# Patient Record
Sex: FEMALE | Race: BLACK OR AFRICAN AMERICAN | NOT HISPANIC OR LATINO | Employment: PART TIME | URBAN - NONMETROPOLITAN AREA
[De-identification: names, ages, dates, MRNs, and addresses within clinical notes are randomized per-mention and may not be internally consistent; named-entity substitution may affect disease eponyms.]

---
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
## 2018-01-02 ENCOUNTER — OFFICE VISIT (OUTPATIENT)
Dept: FAMILY MEDICINE | Facility: OTHER | Age: 18
End: 2018-01-02
Attending: FAMILY MEDICINE
Payer: COMMERCIAL

## 2018-01-02 VITALS
HEIGHT: 71 IN | TEMPERATURE: 98 F | DIASTOLIC BLOOD PRESSURE: 62 MMHG | WEIGHT: 224 LBS | SYSTOLIC BLOOD PRESSURE: 112 MMHG | BODY MASS INDEX: 31.36 KG/M2

## 2018-01-02 DIAGNOSIS — F41.9 ANXIETY: Primary | ICD-10-CM

## 2018-01-02 DIAGNOSIS — E83.51 LOW CALCIUM LEVELS: ICD-10-CM

## 2018-01-02 DIAGNOSIS — F19.10 POLYSUBSTANCE ABUSE (H): ICD-10-CM

## 2018-01-02 DIAGNOSIS — R53.83 FATIGUE, UNSPECIFIED TYPE: ICD-10-CM

## 2018-01-02 DIAGNOSIS — E55.9 VITAMIN D DEFICIENCY: ICD-10-CM

## 2018-01-02 LAB
ALBUMIN SERPL-MCNC: 3.1 G/DL (ref 3.4–5)
ALP SERPL-CCNC: 107 U/L (ref 40–150)
ALT SERPL W P-5'-P-CCNC: 13 U/L (ref 0–50)
ANION GAP SERPL CALCULATED.3IONS-SCNC: 9 MMOL/L (ref 3–14)
AST SERPL W P-5'-P-CCNC: 15 U/L (ref 0–35)
BILIRUB DIRECT SERPL-MCNC: <0.1 MG/DL (ref 0–0.2)
BILIRUB SERPL-MCNC: 0.1 MG/DL (ref 0.2–1.3)
BUN SERPL-MCNC: 9 MG/DL (ref 7–30)
CALCIUM SERPL-MCNC: 8.5 MG/DL (ref 8.5–10.1)
CHLORIDE SERPL-SCNC: 107 MMOL/L (ref 96–110)
CO2 SERPL-SCNC: 24 MMOL/L (ref 20–32)
CREAT SERPL-MCNC: 0.53 MG/DL (ref 0.5–1)
ERYTHROCYTE [DISTWIDTH] IN BLOOD BY AUTOMATED COUNT: 13.2 % (ref 10–15)
GFR SERPL CREATININE-BSD FRML MDRD: >90 ML/MIN/1.7M2
GLUCOSE SERPL-MCNC: 96 MG/DL (ref 70–99)
HCT VFR BLD AUTO: 40.3 % (ref 35–47)
HGB BLD-MCNC: 13 G/DL (ref 11.7–15.7)
MCH RBC QN AUTO: 26.6 PG (ref 26.5–33)
MCHC RBC AUTO-ENTMCNC: 32.3 G/DL (ref 31.5–36.5)
MCV RBC AUTO: 82 FL (ref 78–100)
PLATELET # BLD AUTO: 288 10E9/L (ref 150–450)
POTASSIUM SERPL-SCNC: 3.5 MMOL/L (ref 3.4–5.3)
PROT SERPL-MCNC: 7.3 G/DL (ref 6.8–8.8)
RBC # BLD AUTO: 4.89 10E12/L (ref 3.8–5.2)
SODIUM SERPL-SCNC: 140 MMOL/L (ref 133–144)
TSH SERPL DL<=0.005 MIU/L-ACNC: 3.06 MU/L (ref 0.4–4)
WBC # BLD AUTO: 7.4 10E9/L (ref 4–11)

## 2018-01-02 PROCEDURE — 84443 ASSAY THYROID STIM HORMONE: CPT | Mod: ZL | Performed by: FAMILY MEDICINE

## 2018-01-02 PROCEDURE — 99213 OFFICE O/P EST LOW 20 MIN: CPT | Performed by: FAMILY MEDICINE

## 2018-01-02 PROCEDURE — 82306 VITAMIN D 25 HYDROXY: CPT | Mod: ZL | Performed by: FAMILY MEDICINE

## 2018-01-02 PROCEDURE — 80048 BASIC METABOLIC PNL TOTAL CA: CPT | Mod: ZL | Performed by: FAMILY MEDICINE

## 2018-01-02 PROCEDURE — 36415 COLL VENOUS BLD VENIPUNCTURE: CPT | Mod: ZL | Performed by: FAMILY MEDICINE

## 2018-01-02 PROCEDURE — G0463 HOSPITAL OUTPT CLINIC VISIT: HCPCS

## 2018-01-02 PROCEDURE — 85027 COMPLETE CBC AUTOMATED: CPT | Mod: ZL | Performed by: FAMILY MEDICINE

## 2018-01-02 PROCEDURE — 99000 SPECIMEN HANDLING OFFICE-LAB: CPT | Performed by: FAMILY MEDICINE

## 2018-01-02 PROCEDURE — 80076 HEPATIC FUNCTION PANEL: CPT | Mod: ZL | Performed by: FAMILY MEDICINE

## 2018-01-02 PROCEDURE — 82330 ASSAY OF CALCIUM: CPT | Mod: ZL | Performed by: FAMILY MEDICINE

## 2018-01-02 RX ORDER — FLUOXETINE 10 MG/1
10 CAPSULE ORAL DAILY
Qty: 7 CAPSULE | Refills: 0 | Status: SHIPPED | OUTPATIENT
Start: 2018-01-02 | End: 2018-01-09

## 2018-01-02 ASSESSMENT — ANXIETY QUESTIONNAIRES
GAD7 TOTAL SCORE: 3
2. NOT BEING ABLE TO STOP OR CONTROL WORRYING: NOT AT ALL
6. BECOMING EASILY ANNOYED OR IRRITABLE: NOT AT ALL
3. WORRYING TOO MUCH ABOUT DIFFERENT THINGS: NOT AT ALL
5. BEING SO RESTLESS THAT IT IS HARD TO SIT STILL: NOT AT ALL
IF YOU CHECKED OFF ANY PROBLEMS ON THIS QUESTIONNAIRE, HOW DIFFICULT HAVE THESE PROBLEMS MADE IT FOR YOU TO DO YOUR WORK, TAKE CARE OF THINGS AT HOME, OR GET ALONG WITH OTHER PEOPLE: SOMEWHAT DIFFICULT
7. FEELING AFRAID AS IF SOMETHING AWFUL MIGHT HAPPEN: NOT AT ALL
4. TROUBLE RELAXING: NOT AT ALL
1. FEELING NERVOUS, ANXIOUS, OR ON EDGE: NEARLY EVERY DAY

## 2018-01-02 ASSESSMENT — PATIENT HEALTH QUESTIONNAIRE - PHQ9: SUM OF ALL RESPONSES TO PHQ QUESTIONS 1-9: 2

## 2018-01-02 ASSESSMENT — PAIN SCALES - GENERAL: PAINLEVEL: NO PAIN (0)

## 2018-01-02 NOTE — NURSING NOTE
"Chief Complaint   Patient presents with     Establish Care     Depression       Initial /62 (BP Location: Right arm, Patient Position: Chair, Cuff Size: Adult Large)  Temp 98  F (36.7  C) (Tympanic)  Ht 5' 11\" (1.803 m)  Wt 224 lb (101.6 kg)  BMI 31.24 kg/m2 Estimated body mass index is 31.24 kg/(m^2) as calculated from the following:    Height as of this encounter: 5' 11\" (1.803 m).    Weight as of this encounter: 224 lb (101.6 kg).  Medication Reconciliation: complete     Mabel Jon     "

## 2018-01-02 NOTE — PATIENT INSTRUCTIONS
Treating Anxiety Disorders with Medicine  An anxiety disorder can make you feel nervous or apprehensive, even without a clear reason. In people age 65 and older, generalized anxiety disorder is one of the most commonly diagnosed anxiety disorders. Many times it occurs with depression. Certain anxiety disorders can cause intense feelings of fear or panic. You may even have physical symptoms such as a racing heartbeat, sweating, or dizziness. If you have these feelings, you don t have to suffer anymore. Treatment to help you overcome your fears will likely include therapy (also called counseling). Medicine may also be prescribed to help control your symptoms.    Medicines  Certain medicines may be prescribed to help control your symptoms. So you may feel less anxious. You may also feel able to move forward with therapy. At first, medicines and dosages may need to be adjusted to find what works best for you. Try to be patient. Tell your healthcare provider how a medicine makes you feel. This way, you can work together to find the treatment that s best for you. Keep in mind that medicines can have side effects. Talk with your provider about any side effects that are bothering you. Changing the dose or type of medicine may help. Don t stop taking medicine on your own. That can cause symptoms to come back.    Anti-anxiety medicine. This medicine eases symptoms and helps you relax. Your healthcare provider will explain when and how to use it. It may be prescribed for use before situations that make you anxious. You may also be told to take medicine on a regular schedule. Anti-anxiety medicine may make you feel a little sleepy or  out of it.  Don t drive a car or operate machinery while on this medicine, until you know how it affects you.  Caution  Never use alcohol or other drugs with anti-anxiety medicines. This could result in loss of muscular control, sedation, coma, or death. Also, use only the amount of medicine  prescribed for you. If you think you may have taken too much, get emergency care right away.     Antidepressant medicine. This kind of medicine is often used to treat anxiety, even if you aren t depressed. An antidepressant helps balance out brain chemicals. This helps keep anxiety under control. This medicine is taken on a schedule. It takes a few weeks to start working. If you don t notice a change at first, you may just need more time. But if you don t notice results after the first few weeks, tell your provider.  Keep taking medicines as prescribed  Never change your dosage, share or use another person's medicine, or stop taking your medicines without talking to your healthcare provider first. Keep the following in mind:    Some medicines must be taken on a schedule. Make this part of your daily routine. For instance, always take your pill before brushing your teeth. A pillbox can help you remember if you ve taken your medicine each day.    Medicines are often taken for 6 to 12 months. Your healthcare provider will then evaluate whether you need to stay on them. Many people who have also had therapy may no longer need medicine to manage anxiety.    You may need to stop taking medicine slowly to give your body time to adjust. When it s time to stop, your healthcare provider will tell you more. Remember: Never stop taking your medicine without talking to your provider first.    If symptoms return, you may need to start taking medicines again. This isn t your fault. It s just the nature of your anxiety disorder.  Special concerns    Side effects. Medicines may cause side effects. Ask your healthcare provider or pharmacist what you can expect. They may have ideas for avoiding some side effects.    Sexual problems. Some antidepressants can affect your desire for sex or your ability to have an orgasm. A change in dosage or medicine often solves the problem. If you have a sexual side effect that concerns you, tell your  healthcare provider.    Addiction. If you ve never had a problem with drugs or alcohol, you may not have a problem with medicines used to treat anxiety disorders. But always discuss the medicines with your healthcare provider before taking them. If you have a history of addiction, you may not be able to use certain medicines used to treat anxiety disorders.    Medicine interactions. Always check with your pharmacist before using any over-the-counter medicines, including herbal supplements.   Date Last Reviewed: 5/1/2017 2000-2017 [employer]. [address] [place] [address]. All rights reserved. This information is not intended as a substitute for professional medical care. Always follow your healthcare professional's instructions.

## 2018-01-02 NOTE — MR AVS SNAPSHOT
After Visit Summary   1/2/2018    Zara Aguilera    MRN: [id number]           Patient Information     Date Of Birth          2000        Visit Information        Provider Department      1/2/2018 4:30 PM Kate Moyer MD [facility] [place]        Today's Diagnoses     Anxiety    -  1    Vitamin D deficiency        Low calcium levels        Fatigue, unspecified type        Polysubstance abuse          Care Instructions      Treating Anxiety Disorders with Medicine  An anxiety disorder can make you feel nervous or apprehensive, even without a clear reason. In people age 65 and older, generalized anxiety disorder is one of the most commonly diagnosed anxiety disorders. Many times it occurs with depression. Certain anxiety disorders can cause intense feelings of fear or panic. You may even have physical symptoms such as a racing heartbeat, sweating, or dizziness. If you have these feelings, you don t have to suffer anymore. Treatment to help you overcome your fears will likely include therapy (also called counseling). Medicine may also be prescribed to help control your symptoms.    Medicines  Certain medicines may be prescribed to help control your symptoms. So you may feel less anxious. You may also feel able to move forward with therapy. At first, medicines and dosages may need to be adjusted to find what works best for you. Try to be patient. Tell your healthcare provider how a medicine makes you feel. This way, you can work together to find the treatment that s best for you. Keep in mind that medicines can have side effects. Talk with your provider about any side effects that are bothering you. Changing the dose or type of medicine may help. Don t stop taking medicine on your own. That can cause symptoms to come back.    Anti-anxiety medicine. This medicine eases symptoms and helps you relax. Your healthcare provider will explain when and how to use it. It may be prescribed for use  before situations that make you anxious. You may also be told to take medicine on a regular schedule. Anti-anxiety medicine may make you feel a little sleepy or  out of it.  Don t drive a car or operate machinery while on this medicine, until you know how it affects you.  Caution  Never use alcohol or other drugs with anti-anxiety medicines. This could result in loss of muscular control, sedation, coma, or death. Also, use only the amount of medicine prescribed for you. If you think you may have taken too much, get emergency care right away.     Antidepressant medicine. This kind of medicine is often used to treat anxiety, even if you aren t depressed. An antidepressant helps balance out brain chemicals. This helps keep anxiety under control. This medicine is taken on a schedule. It takes a few weeks to start working. If you don t notice a change at first, you may just need more time. But if you don t notice results after the first few weeks, tell your provider.  Keep taking medicines as prescribed  Never change your dosage, share or use another person's medicine, or stop taking your medicines without talking to your healthcare provider first. Keep the following in mind:    Some medicines must be taken on a schedule. Make this part of your daily routine. For instance, always take your pill before brushing your teeth. A pillbox can help you remember if you ve taken your medicine each day.    Medicines are often taken for 6 to 12 months. Your healthcare provider will then evaluate whether you need to stay on them. Many people who have also had therapy may no longer need medicine to manage anxiety.    You may need to stop taking medicine slowly to give your body time to adjust. When it s time to stop, your healthcare provider will tell you more. Remember: Never stop taking your medicine without talking to your provider first.    If symptoms return, you may need to start taking medicines again. This isn t your fault.  It s just the nature of your anxiety disorder.  Special concerns    Side effects. Medicines may cause side effects. Ask your healthcare provider or pharmacist what you can expect. They may have ideas for avoiding some side effects.    Sexual problems. Some antidepressants can affect your desire for sex or your ability to have an orgasm. A change in dosage or medicine often solves the problem. If you have a sexual side effect that concerns you, tell your healthcare provider.    Addiction. If you ve never had a problem with drugs or alcohol, you may not have a problem with medicines used to treat anxiety disorders. But always discuss the medicines with your healthcare provider before taking them. If you have a history of addiction, you may not be able to use certain medicines used to treat anxiety disorders.    Medicine interactions. Always check with your pharmacist before using any over-the-counter medicines, including herbal supplements.   Date Last Reviewed: 5/1/2017 2000-2017 [employer]. [address] [place] [address]. All rights reserved. This information is not intended as a substitute for professional medical care. Always follow your healthcare professional's instructions.                Follow-ups after your visit        Follow-up notes from your care team     Return in about 1 week (around 1/9/2018).      Who to contact     If you have questions or need follow up information about today's clinic visit or your schedule please contact [facility] directly at [phone].  Normal or non-critical lab and imaging results will be communicated to you by MyChart, letter or phone within 4 business days after the clinic has received the results. If you do not hear from us within 7 days, please contact the clinic through MyChart or phone. If you have a critical or abnormal lab result, we will notify you by phone as soon as possible.  Submit refill requests through [employer] or  "call your pharmacy and they will forward the refill request to us. Please allow 3 business days for your refill to be completed.          Additional Information About Your Visit        MyChart Information     [employer] lets you send messages to your doctor, view your test results, renew your prescriptions, schedule appointments and more. To sign up, go to [email or web address] . Click on \"Log in\" on the left side of the screen, which will take you to the Welcome page. Then click on \"Sign up Now\" on the right side of the page.     You will be asked to enter the access code listed below, as well as some personal information. Please follow the directions to create your username and password.     Your access code is: [email or web address]  Expires: 3/13/2018 11:03 AM     Your access code will  in 90 days. If you need help or a new code, please call your [place] [facility] clinic or [phone].        Care EveryWhere ID     This is your Care EveryWhere ID. This could be used by other organizations to access your [facility] medical records  [id number]        Your Vitals Were     Temperature Height BMI (Body Mass Index)             98  F (36.7  C) (Tympanic) 5' 11\" (1.803 m) 31.24 kg/m2          Blood Pressure from Last 3 Encounters:   18 112/62   17 119/79   17 113/53    Weight from Last 3 Encounters:   18 224 lb (101.6 kg) (99 %)*   17 212 lb (96.2 kg) (98 %)*   17 212 lb (96.2 kg) (98 %)*     * Growth percentiles are based on CDC 2-20 Years data.              We Performed the Following     Basic metabolic panel     Calcium ionized     CBC with platelets     Hepatic panel (Albumin, ALT, AST, Bili, Alk Phos, TP)     TSH with free T4 reflex     Vitamin D Deficiency          Today's Medication Changes          These changes are accurate as of: 18  4:54 PM.  If you have any questions, ask your nurse or doctor.               These medicines have changed or have updated prescriptions.        " Dose/Directions    * FLUoxetine 10 MG capsule   Commonly known as:  PROzac   This may have changed:  You were already taking a medication with the same name, and this prescription was added. Make sure you understand how and when to take each.   Used for:  Anxiety   Changed by:  Kate Moyer MD        Dose:  10 mg   Take 1 capsule (10 mg) by mouth daily for 7 days Take with the 20mg capsule for 7 days for a total dose of 30mg. Then increase to 40mg (2, 20mg pills).   Quantity:  7 capsule   Refills:  0       * FLUoxetine 20 MG capsule   Commonly known as:  PROZAC   This may have changed:    - medication strength  - how much to take   Used for:  Anxiety   Changed by:  Kate Moyer MD        Dose:  40 mg   Take 2 capsules (40 mg) by mouth daily   Quantity:  60 capsule   Refills:  0       * Notice:  This list has 2 medication(s) that are the same as other medications prescribed for you. Read the directions carefully, and ask your doctor or other care provider to review them with you.         Where to get your medicines      These medications were sent to [facility] - [address]  [address] [address]     Phone:  [phone]     FLUoxetine 20 MG capsule         Some of these will need a paper prescription and others can be bought over the counter.  Ask your nurse if you have questions.     Bring a paper prescription for each of these medications     FLUoxetine 10 MG capsule                Primary Care Provider Office Phone # Fax #    Sadia OlmedoSRINI [facility] [phone]       [address] [address]        Equal Access to Services     SWAPNA TAYLOR AH: Hadii svetlana estradao [place], waaxda luqadaha, qaybta kaalmada adeegyada, ramana rodriguez. So [facility] [phone].    ATENCIÓN: Si habla español, tiene a silva disposición servicios gratuitos de asistencia lingüística. Llame al [phone].    We comply with  applicable federal civil rights laws and [place] laws. We do not discriminate on the basis of race, color, national origin, age, disability, sex, sexual orientation, or gender identity.            Thank you!     Thank you for choosing [facility]  for your care. Our goal is always to provide you with excellent care. Hearing back from our patients is one way we can continue to improve our services. Please take a few minutes to complete the written survey that you may receive in the mail after your visit with us. Thank you!             Your Updated Medication List - Protect others around you: Learn how to safely use, store and throw away your medicines at www.disposemymeds.org.          This list is accurate as of: 1/2/18  4:54 PM.  Always use your most recent med list.                   Brand Name Dispense Instructions for use Diagnosis    * FLUoxetine 10 MG capsule    PROzac    7 capsule    Take 1 capsule (10 mg) by mouth daily for 7 days Take with the 20mg capsule for 7 days for a total dose of 30mg. Then increase to 40mg (2, 20mg pills).    Anxiety       * FLUoxetine 20 MG capsule    PROZAC    60 capsule    Take 2 capsules (40 mg) by mouth daily    Anxiety       norgestimate-ethinyl estradiol 0.25-35 MG-MCG per tablet    ORTHO-CYCLEN, SPRINTEC    84 tablet    Take 1 tablet by mouth daily    Encounter for surveillance of contraceptive pills       UNKNOWN TO PATIENT           * Notice:  This list has 2 medication(s) that are the same as other medications prescribed for you. Read the directions carefully, and ask your doctor or other care provider to review them with you.

## 2018-01-02 NOTE — PROGRESS NOTES
SUBJECTIVE:   Zara Aguilera is a 17 year old female who presents to clinic today for the following health issues:      Depression Followup    Status since last visit: Improved with prozac    See PHQ-9 for current symptoms.  Other associated symptoms: None    Complicating factors:   Significant life event:  Yes-  Went to treatment   Current substance abuse:  None  Anxiety or Panic symptoms:  Yes-  Anxiety symptoms    In the past two weeks have you had thoughts of suicide or self-harm?  No.    Do you have concerns about your personal safety or the safety of others?   No      Amount of exercise or physical activity: None    Problems taking medications regularly: No    Medication side effects: none    Diet: regular (no restrictions)      Pt is a 18 yo F here with her mother who presents to establish care and for refill of medication. Pt has history of heroin, thc and methamphetamine use. She also reports long standing history of anxiety. Reports anxiety first diagnosed at age 11, this was around the same time she started using THC. Her drug use ramped over the next several years and she was trialed on Cymbalta, Abilify and Lamictal for mood. Mom feels the Abilify made her a zombie, but otherwise was hard to say if treatments helped due to her ongoing drug abuse. She has had 3 suicide attempts, two with overdosing on pills and 1 when she attempted to slit her wrists. She finally presented to the ED on 9/11 for clearance to go to detox. She has been off drugs since that time. She was transferred to the [facility] after detox for probation violation related to theft charges in the past. While at [facility] she was seen by a psychiatrist and prescribed prozac 20mg. She feels this has helped a fair amount. Questions whether or not she needs to increase, however. Pt also notes significant fatigue. Mom states she naps several times a day. Mom would like thyroid testing as she also has thyroid issues. Pt is back at  and is completing  "11th grade. She has a lot of anxiety going to school on a daily basis. Almost vomited on the first day due to nerves. Is doing fairly well, however, per mom. Has no other etoh or substance use (save for tobacco) since 9/11. No SI, SIB currently.     Problem list and histories reviewed & adjusted, as indicated.  Additional history: as documented    Labs reviewed in EPIC    Reviewed and updated as needed this visit by clinical staffTobacco  Allergies  Meds  Med Hx  Surg Hx  Fam Hx  Soc Hx      Reviewed and updated as needed this visit by Provider         ROS:  Constitutional, HEENT, cardiovascular, pulmonary, gi and gu systems are negative, except as otherwise noted.      OBJECTIVE:   /62 (BP Location: Right arm, Patient Position: Chair, Cuff Size: Adult Large)  Temp 98  F (36.7  C) (Tympanic)  Ht 5' 11\" (1.803 m)  Wt 224 lb (101.6 kg)  BMI 31.24 kg/m2  Body mass index is 31.24 kg/(m^2).  GENERAL: healthy, alert and no distress  EYES: Eyes grossly normal to inspection, PERRL and conjunctivae and sclerae normal  HENT: ear canals and TM's normal, nose and mouth without ulcers or lesions  NECK: no adenopathy, no asymmetry, masses, or scars and thyroid normal to palpation  RESP: lungs clear to auscultation - no rales, rhonchi or wheezes  CV: regular rate and rhythm, normal S1 S2, no S3 or S4, no murmur, click or rub, no peripheral edema and peripheral pulses strong  ABDOMEN: soft, nontender, no hepatosplenomegaly, no masses and bowel sounds normal  MS: no gross musculoskeletal defects noted, no edema  PSYCH: mentation appears normal, affect slightly blunted, appears fatigued    Diagnostic Test Results:  Results for orders placed or performed in visit on 01/02/18 (from the past 24 hour(s))   TSH with free T4 reflex   Result Value Ref Range    TSH 3.06 0.40 - 4.00 mU/L   CBC with platelets   Result Value Ref Range    WBC 7.4 4.0 - 11.0 10e9/L    RBC Count 4.89 3.8 - 5.2 10e12/L    Hemoglobin 13.0 11.7 - 15.7 " g/dL    Hematocrit 40.3 35.0 - 47.0 %    MCV 82 78 - 100 fl    MCH 26.6 26.5 - 33.0 pg    MCHC 32.3 31.5 - 36.5 g/dL    RDW 13.2 10.0 - 15.0 %    Platelet Count 288 150 - 450 10e9/L   Hepatic panel (Albumin, ALT, AST, Bili, Alk Phos, TP)   Result Value Ref Range    Bilirubin Direct <0.1 0.0 - 0.2 mg/dL    Bilirubin Total 0.1 (L) 0.2 - 1.3 mg/dL    Albumin 3.1 (L) 3.4 - 5.0 g/dL    Protein Total 7.3 6.8 - 8.8 g/dL    Alkaline Phosphatase 107 40 - 150 U/L    ALT 13 0 - 50 U/L    AST 15 0 - 35 U/L   Basic metabolic panel   Result Value Ref Range    Sodium 140 133 - 144 mmol/L    Potassium 3.5 3.4 - 5.3 mmol/L    Chloride 107 96 - 110 mmol/L    Carbon Dioxide 24 20 - 32 mmol/L    Anion Gap 9 3 - 14 mmol/L    Glucose 96 70 - 99 mg/dL    Urea Nitrogen 9 7 - 30 mg/dL    Creatinine 0.53 0.50 - 1.00 mg/dL    GFR Estimate >90 >60 mL/min/1.7m2    GFR Estimate If Black >90 >60 mL/min/1.7m2    Calcium 8.5 8.5 - 10.1 mg/dL       ASSESSMENT/PLAN:     1. Anxiety  Likely some depression as well. Increase prozac to 30mg for a week then to 40mg daily. Follow up in 3-4 weeks. Call with side effects. Suspect this may be cause of fatigue.   - FLUoxetine (PROZAC) 10 MG capsule; Take 1 capsule (10 mg) by mouth daily for 7 days Take with the 20mg capsule for 7 days for a total dose of 30mg. Then increase to 40mg (2, 20mg pills).  Dispense: 7 capsule; Refill: 0  - FLUoxetine (PROZAC) 20 MG capsule; Take 2 capsules (40 mg) by mouth daily  Dispense: 60 capsule; Refill: 0    2. Vitamin D deficiency /  Low calcium levels  Noted in the past, likely related to low albumin, recheck with D.   - Calcium ionized  - Vitamin D Deficiency    4. Fatigue, unspecified type  Discussed that may simply reflect ongoing depression/anxiety. Will screen labwork toay, however. Increase prozac as above.   - TSH with free T4 reflex  - CBC with platelets  - Hepatic panel (Albumin, ALT, AST, Bili, Alk Phos, TP)  - Basic metabolic panel    5. Polysubstance abuse  Clean  for several months. No urges to use at this time. Appears to be in supportive environment. Monitor. Avoid addictive medication.       Kate Moyer MD  [facility]

## 2018-01-03 LAB — CA-I SERPL ISE-MCNC: 4.9 MG/DL (ref 4.4–5.2)

## 2018-01-03 ASSESSMENT — ANXIETY QUESTIONNAIRES: GAD7 TOTAL SCORE: 3

## 2018-01-04 LAB — DEPRECATED CALCIDIOL+CALCIFEROL SERPL-MC: 16 UG/L (ref 20–75)

## 2018-01-05 ENCOUNTER — TELEPHONE (OUTPATIENT)
Dept: FAMILY MEDICINE | Facility: OTHER | Age: 18
End: 2018-01-05

## 2018-01-05 DIAGNOSIS — E55.9 VITAMIN D DEFICIENCY: Primary | ICD-10-CM

## 2018-01-05 NOTE — TELEPHONE ENCOUNTER
Please let patient know that her labwork is back. Her labs generally look okay. Her Vitamin D is low, so I have sent in a rx for a weekly supplement to [facility]. She should take for 6 weeks and then return for recheck. This can be a lab only visit, order is in. Her albumin levels are also low, encourage her to work on healthy eating habits. We can recheck these at a later date.   Kate Moyer MD

## 2018-03-01 DIAGNOSIS — Z00.00 HEALTHCARE MAINTENANCE: Primary | ICD-10-CM

## 2018-03-01 NOTE — TELEPHONE ENCOUNTER
Vitamin D 50,000  Last office visit: 01/02/18  Last refill: not listed on patient's current medication list  Please advise.    Thank you.

## 2018-03-06 NOTE — TELEPHONE ENCOUNTER
Patient is notified and will schedule appt as directed.  Transferred to scheduling.  Joanne Comer

## 2018-03-09 ENCOUNTER — TELEPHONE (OUTPATIENT)
Dept: FAMILY MEDICINE | Facility: OTHER | Age: 18
End: 2018-03-09

## 2018-03-09 NOTE — TELEPHONE ENCOUNTER
"Patients mother called and stated Zara is in a \"bad place\" right now and will not make it in to her appointment. It is after her appointment time so it is considered a \"no show.\" Mother also stated Zara is off of her meds right now and will hopefully be contacting us shortly.   "

## 2018-04-10 ENCOUNTER — OFFICE VISIT (OUTPATIENT)
Dept: FAMILY MEDICINE | Facility: OTHER | Age: 18
End: 2018-04-10
Attending: FAMILY MEDICINE
Payer: COMMERCIAL

## 2018-04-10 VITALS
DIASTOLIC BLOOD PRESSURE: 54 MMHG | WEIGHT: 220.38 LBS | HEART RATE: 72 BPM | SYSTOLIC BLOOD PRESSURE: 108 MMHG | OXYGEN SATURATION: 97 % | BODY MASS INDEX: 30.74 KG/M2 | TEMPERATURE: 97.9 F

## 2018-04-10 DIAGNOSIS — E55.9 VITAMIN D DEFICIENCY: ICD-10-CM

## 2018-04-10 DIAGNOSIS — F41.9 ANXIETY: Primary | ICD-10-CM

## 2018-04-10 DIAGNOSIS — F12.10 MILD TETRAHYDROCANNABINOL (THC) ABUSE: ICD-10-CM

## 2018-04-10 DIAGNOSIS — R10.10 PAIN OF UPPER ABDOMEN: ICD-10-CM

## 2018-04-10 LAB
ALBUMIN SERPL-MCNC: 3.5 G/DL (ref 3.4–5)
ALP SERPL-CCNC: 109 U/L (ref 40–150)
ALT SERPL W P-5'-P-CCNC: 15 U/L (ref 0–50)
ANION GAP SERPL CALCULATED.3IONS-SCNC: 6 MMOL/L (ref 3–14)
AST SERPL W P-5'-P-CCNC: 14 U/L (ref 0–35)
BILIRUB SERPL-MCNC: 0.5 MG/DL (ref 0.2–1.3)
BUN SERPL-MCNC: 9 MG/DL (ref 7–30)
CALCIUM SERPL-MCNC: 9 MG/DL (ref 8.5–10.1)
CHLORIDE SERPL-SCNC: 107 MMOL/L (ref 96–110)
CO2 SERPL-SCNC: 28 MMOL/L (ref 20–32)
CREAT SERPL-MCNC: 0.64 MG/DL (ref 0.5–1)
GFR SERPL CREATININE-BSD FRML MDRD: >90 ML/MIN/1.7M2
GLUCOSE SERPL-MCNC: 80 MG/DL (ref 70–99)
LIPASE SERPL-CCNC: 78 U/L (ref 73–393)
POTASSIUM SERPL-SCNC: 3.7 MMOL/L (ref 3.4–5.3)
PROT SERPL-MCNC: 7.5 G/DL (ref 6.8–8.8)
SODIUM SERPL-SCNC: 141 MMOL/L (ref 133–144)

## 2018-04-10 PROCEDURE — 99214 OFFICE O/P EST MOD 30 MIN: CPT | Performed by: FAMILY MEDICINE

## 2018-04-10 PROCEDURE — 83690 ASSAY OF LIPASE: CPT | Mod: ZL | Performed by: FAMILY MEDICINE

## 2018-04-10 PROCEDURE — 80053 COMPREHEN METABOLIC PANEL: CPT | Mod: ZL | Performed by: FAMILY MEDICINE

## 2018-04-10 PROCEDURE — 99000 SPECIMEN HANDLING OFFICE-LAB: CPT | Performed by: FAMILY MEDICINE

## 2018-04-10 PROCEDURE — 36415 COLL VENOUS BLD VENIPUNCTURE: CPT | Mod: ZL | Performed by: FAMILY MEDICINE

## 2018-04-10 PROCEDURE — 82306 VITAMIN D 25 HYDROXY: CPT | Mod: ZL | Performed by: FAMILY MEDICINE

## 2018-04-10 PROCEDURE — G0463 HOSPITAL OUTPT CLINIC VISIT: HCPCS

## 2018-04-10 RX ORDER — SUCRALFATE 1 G/1
1 TABLET ORAL
Qty: 40 TABLET | Refills: 1 | Status: SHIPPED | OUTPATIENT
Start: 2018-04-10 | End: 2018-05-24

## 2018-04-10 ASSESSMENT — ANXIETY QUESTIONNAIRES
IF YOU CHECKED OFF ANY PROBLEMS ON THIS QUESTIONNAIRE, HOW DIFFICULT HAVE THESE PROBLEMS MADE IT FOR YOU TO DO YOUR WORK, TAKE CARE OF THINGS AT HOME, OR GET ALONG WITH OTHER PEOPLE: EXTREMELY DIFFICULT
6. BECOMING EASILY ANNOYED OR IRRITABLE: NEARLY EVERY DAY
7. FEELING AFRAID AS IF SOMETHING AWFUL MIGHT HAPPEN: NOT AT ALL
2. NOT BEING ABLE TO STOP OR CONTROL WORRYING: MORE THAN HALF THE DAYS
5. BEING SO RESTLESS THAT IT IS HARD TO SIT STILL: NEARLY EVERY DAY
4. TROUBLE RELAXING: MORE THAN HALF THE DAYS
1. FEELING NERVOUS, ANXIOUS, OR ON EDGE: NEARLY EVERY DAY
GAD7 TOTAL SCORE: 16
3. WORRYING TOO MUCH ABOUT DIFFERENT THINGS: NEARLY EVERY DAY

## 2018-04-10 ASSESSMENT — PAIN SCALES - GENERAL: PAINLEVEL: NO PAIN (0)
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
## 2018-04-10 NOTE — PROGRESS NOTES
SUBJECTIVE:                                                    aZra Aguilera is a 17 year old female who presents to clinic today for the following health issues:      Abdominal Pain      Duration: many years just been getting worse over the last couple months    Description (location/character/radiation): upper abdomen under breasts       Associated flank pain: lower right side    Intensity:  mild    Accompanying signs and symptoms:        Fever/Chills: yes chills        Gas/Bloating: no        Nausea/vomitting: YES- nausea       Diarrhea: YES       Dysuria or Hematuria: no     History (previous similar pain/trauma/previous testing): been dealing with this for many years    Precipitating or alleviating factors:       Pain worse with eating/BM/urination: about 20 minutes after eating pain starts. Also will just wake up with pains.       Pain relieved by BM: no     Therapies tried and outcome: pepto, antacids, omeprazole, helps for a short period of time.     LMP:  Roughly about 2 weeks    This is chronic for patient. It has not changed in character, but more frequent lately. Pain is upper abdomen. Is worse with eating, but may occur even without eating. It is not related to position. Constantly feels nauseated, no vomiting. Stooling is intermittently loose, but is pretty regular. There is constipation. Does not have to strain for BM. No blood. No cramping pain. Pain is not improved with BM. No gas. Has been seen for this before. Has been prescribed antacids, PPIs and these never were helpful. She is not open to trying these again. Has not lost weight. No dysphagia. labwork has been unremarkable on several occasions since the abd pain started.     Depression and Anxiety Follow-Up    Status since last visit: Worsened anxiety is worse than depression. Feels like the anxiety ramps up the depression    Other associated symptoms:None    Complicating factors:     Significant life event: No     Current substance abuse:  "Cannabis last use was last night.    PHQ-9 1/2/2018   Total Score 2   Q9: Suicide Ideation Not at all     VERÓNICA-7 SCORE 1/2/2018   Total Score 3     In the past two weeks have you had thoughts of suicide or self-harm?  No.    Do you have concerns about your personal safety or the safety of others?   No    Patient stopped her prozac several weeks ago. She cannot explain why, just that \"I do that sometimes.\" She does not want to be on a pill everyday for things. Mood is definitely worse. She is missing school multiple days a week because of anxiety. Is rarely leaving the house even to go to the grocery store due to anxiety. Has to \"ramp herself up\" to go even to a friend's home. Energy is low. Sleeping well and all the time. No side effects of the prozac. Is smoking pot regularly.     Problem list and histories reviewed & adjusted, as indicated.  Additional history: as documented    Labs reviewed in EPIC    ROS:  Constitutional, HEENT, cardiovascular, pulmonary, gi and gu systems are negative, except as otherwise noted.    OBJECTIVE:     /54  Pulse 72  Temp 97.9  F (36.6  C) (Tympanic)  Wt 220 lb 6 oz (100 kg)  SpO2 97%  BMI 30.74 kg/m2  Body mass index is 30.74 kg/(m^2).  GENERAL: healthy, alert and no distress  RESP: lungs clear to auscultation - no rales, rhonchi or wheezes  CV: regular rate and rhythm, normal S1 S2, no S3 or S4, no murmur, click or rub, no peripheral edema and peripheral pulses strong  ABDOMEN: soft, nontender, no hepatosplenomegaly, no masses and bowel sounds normal  MS: no gross musculoskeletal defects noted, no edema  PSYCH: Pt is tearful, affect flat    Diagnostic Test Results:  Results for orders placed or performed in visit on 04/10/18   Lipase   Result Value Ref Range    Lipase 78 73 - 393 U/L   Comprehensive metabolic panel (BMP + Alb, Alk Phos, ALT, AST, Total. Bili, TP)   Result Value Ref Range    Sodium 141 133 - 144 mmol/L    Potassium 3.7 3.4 - 5.3 mmol/L    Chloride 107 96 - " 110 mmol/L    Carbon Dioxide 28 20 - 32 mmol/L    Anion Gap 6 3 - 14 mmol/L    Glucose 80 70 - 99 mg/dL    Urea Nitrogen 9 7 - 30 mg/dL    Creatinine 0.64 0.50 - 1.00 mg/dL    GFR Estimate >90 >60 mL/min/1.7m2    GFR Estimate If Black >90 >60 mL/min/1.7m2    Calcium 9.0 8.5 - 10.1 mg/dL    Bilirubin Total 0.5 0.2 - 1.3 mg/dL    Albumin 3.5 3.4 - 5.0 g/dL    Protein Total 7.5 6.8 - 8.8 g/dL    Alkaline Phosphatase 109 40 - 150 U/L    ALT 15 0 - 50 U/L    AST 14 0 - 35 U/L   Vitamin D Deficiency   Result Value Ref Range    Vitamin D Deficiency screening 20 20 - 75 ug/L       ASSESSMENT/PLAN:     1. Anxiety / THC abuse  She is not currently on medications. Using marijuana daily. Discussed other medication options. She feels she is being given the same stuff over and over. Offered gabapentin, but pt cannot take due to red dye allergy. Discussed that she should be seen by Psychiatry provider, she will consider. Mom supportive of this as well. Referral made.   - MENTAL HEALTH REFERRAL  - Adult; Psychiatry and Medication Management; Psychiatry; Range: [facility] [phone]; We will contact you to schedule the appointment or please call with any questions    2. Pain of upper abdomen  Labwork unremarkable. Location suggestive of GERD, however, suspect anxiety plays a large role in this and discussed my thoughts with patient. She can try carafate before meals to help with food related pain. At this point feel EGD is likely low yield, but we can consider this. Pt declines for now.   - Lipase  - Comprehensive metabolic panel (BMP + Alb, Alk Phos, ALT, AST, Total. Bili, TP)  - sucralfate (CARAFATE) 1 GM tablet; Take 1 tablet (1 g) by mouth 3 times daily (before meals)  Dispense: 40 tablet; Refill: 1    3. Vitamin D deficiency  Due for recheck.   - Vitamin D Deficiency    TT > 25 min, > 50% of time spent face to face in counseling regarding options for management of anxiety, etiologies and work up of abdominal  pain    Kate Moyer MD  [facility]

## 2018-04-10 NOTE — NURSING NOTE
"Chief Complaint   Patient presents with     Anxiety     Depression     Abdominal Pain       Initial /54  Pulse 72  Temp 97.9  F (36.6  C) (Tympanic)  Wt 220 lb 6 oz (100 kg)  SpO2 97%  BMI 30.74 kg/m2 Estimated body mass index is 30.74 kg/(m^2) as calculated from the following:    Height as of 1/2/18: 5' 11\" (1.803 m).    Weight as of this encounter: 220 lb 6 oz (100 kg).  Medication Reconciliation: complete     Ciarra Thomas    "

## 2018-04-10 NOTE — MR AVS SNAPSHOT
After Visit Summary   4/10/2018    Zara Aguilera    MRN: [id number]           Patient Information     Date Of Birth          2000        Visit Information        Provider Department      4/10/2018 3:30 PM Kate Moyer MD [facility]        Today's Diagnoses     Anxiety    -  1    Mild tetrahydrocannabinol (THC) abuse        Pain of upper abdomen        Vitamin D deficiency           Follow-ups after your visit        Additional Services     MENTAL HEALTH REFERRAL  - Adult; Psychiatry and Medication Management; Psychiatry; Range: [facility] [phone]; We will contact you to schedule the appointment or please call with any questions       All scheduling is subject to the client's specific insurance plan & benefits, provider/location availability, and provider clinical specialities.  Please arrive 15 minutes early for your first appointment and bring your completed paperwork.    Please be aware that coverage of these services is subject to the terms and limitations of your health insurance plan.  Call member services at your health plan with any benefit or coverage questions.                            Follow-up notes from your care team     Return in about 1 month (around 5/10/2018).      Your next 10 appointments already scheduled     Apr 20, 2018  4:00 PM CDT   (Arrive by 3:45 PM)   New Visit with SRINI Humphrey CNP   [facility] [place] ([facility] [place] )    [address]  [place] [id number] [phone]            May 15, 2018  4:00 PM CDT   (Arrive by 3:45 PM)   SHORT with Kate Moyer MD   [facility] ([facility] [place] )    [address]  [place] [address]   [phone]              Who to contact     If you have questions or need follow up information about today's clinic visit or your schedule please contact [facility] directly at [phone].  Normal or  "non-critical lab and imaging results will be communicated to you by MyChart, letter or phone within 4 business days after the clinic has received the results. If you do not hear from us within 7 days, please contact the clinic through [employer] or phone. If you have a critical or abnormal lab result, we will notify you by phone as soon as possible.  Submit refill requests through [employer] or call your pharmacy and they will forward the refill request to us. Please allow 3 business days for your refill to be completed.          Additional Information About Your Visit        [employer] Information     [employer] lets you send messages to your doctor, view your test results, renew your prescriptions, schedule appointments and more. To sign up, go to [email or web address] . Click on \"Log in\" on the left side of the screen, which will take you to the Welcome page. Then click on \"Sign up Now\" on the right side of the page.     You will be asked to enter the access code listed below, as well as some personal information. Please follow the directions to create your username and password.     Your access code is: [email or web address]  Expires: 2018  1:19 PM     Your access code will  in 90 days. If you need help or a new code, please call your [facility] clinic or [phone].        Care EveryWhere ID     This is your Care EveryWhere ID. This could be used by other organizations to access your [facility] medical records  [id number]        Your Vitals Were     Pulse Temperature Pulse Oximetry BMI (Body Mass Index)          72 97.9  F (36.6  C) (Tympanic) 97% 30.74 kg/m2         Blood Pressure from Last 3 Encounters:   04/10/18 108/54   18 112/62   17 119/79    Weight from Last 3 Encounters:   04/10/18 220 lb 6 oz (100 kg) (99 %)*   18 224 lb (101.6 kg) (99 %)*   17 212 lb (96.2 kg) (98 %)*     * Growth percentiles are based on [facility] 2-20 Years data.              We Performed the Following     Comprehensive " metabolic panel (BMP + Alb, Alk Phos, ALT, AST, Total. Bili, TP)     Lipase     MENTAL HEALTH REFERRAL  - Adult; Psychiatry and Medication Management; Psychiatry; Range: [facility] [phone]; We will contact you to schedule the appointment or please call with any questions     Vitamin D Deficiency          Today's Medication Changes          These changes are accurate as of 4/10/18 11:59 PM.  If you have any questions, ask your nurse or doctor.               Start taking these medicines.        Dose/Directions    sucralfate 1 GM tablet   Commonly known as:  CARAFATE   Used for:  Pain of upper abdomen   Started by:  Kate Moyer MD        Dose:  1 g   Take 1 tablet (1 g) by mouth 3 times daily (before meals)   Quantity:  40 tablet   Refills:  1            Where to get your medicines      These medications were sent to [facility] [place] - [address]  [address] [address]     Phone:  [phone]     sucralfate 1 GM tablet                Primary Care Provider Office Phone # Fax #    Kate Moyer [phone] [address] [address]        Equal Access to Services     [facility] : Hadii aad ku hadasho Soomaali, waaxda luqadaha, qaybta kaalmada adeegyada, waxay kylein hayjosen iveth rodriguez. So [facility] [phone].    ATENCIÓN: Si habla español, tiene a silva disposición servicios gratuitos de asistencia lingüística. Llame al [phone].    We comply with applicable federal civil rights laws and [place] laws. We do not discriminate on the basis of race, color, national origin, age, disability, sex, sexual orientation, or gender identity.            Thank you!     Thank you for choosing [facility]  for your care. Our goal is always to provide you with excellent care. Hearing back from our patients is one way we can continue to improve our services. Please take a few minutes to complete the written  survey that you may receive in the mail after your visit with us. Thank you!             Your Updated Medication List - Protect others around you: Learn how to safely use, store and throw away your medicines at www.disposemymeds.org.          This list is accurate as of 4/10/18 11:59 PM.  Always use your most recent med list.                   Brand Name Dispense Instructions for use Diagnosis    FLUoxetine 20 MG capsule    PROZAC    60 capsule    Take 2 capsules (40 mg) by mouth daily    Anxiety       sucralfate 1 GM tablet    CARAFATE    40 tablet    Take 1 tablet (1 g) by mouth 3 times daily (before meals)    Pain of upper abdomen

## 2018-04-11 ASSESSMENT — ANXIETY QUESTIONNAIRES: GAD7 TOTAL SCORE: 16

## 2018-04-11 ASSESSMENT — PATIENT HEALTH QUESTIONNAIRE - PHQ9: SUM OF ALL RESPONSES TO PHQ QUESTIONS 1-9: 14

## 2018-04-12 DIAGNOSIS — E55.9 VITAMIN D DEFICIENCY: Primary | ICD-10-CM

## 2018-04-12 LAB — DEPRECATED CALCIDIOL+CALCIFEROL SERPL-MC: 20 UG/L (ref 20–75)

## 2018-05-24 ENCOUNTER — HOSPITAL ENCOUNTER (EMERGENCY)
Facility: HOSPITAL | Age: 18
Discharge: HOME | End: 2018-05-24
Attending: NURSE PRACTITIONER | Admitting: NURSE PRACTITIONER
Payer: COMMERCIAL

## 2018-05-24 ENCOUNTER — APPOINTMENT (OUTPATIENT)
Dept: GENERAL RADIOLOGY | Facility: HOSPITAL | Age: 18
End: 2018-05-24
Attending: NURSE PRACTITIONER
Payer: COMMERCIAL

## 2018-05-24 VITALS
BODY MASS INDEX: 30.68 KG/M2 | SYSTOLIC BLOOD PRESSURE: 137 MMHG | WEIGHT: 220 LBS | OXYGEN SATURATION: 98 % | DIASTOLIC BLOOD PRESSURE: 81 MMHG | RESPIRATION RATE: 18 BRPM | HEART RATE: 92 BPM | TEMPERATURE: 100 F

## 2018-05-24 DIAGNOSIS — J40 BRONCHITIS: ICD-10-CM

## 2018-05-24 LAB
BASOPHILS # BLD AUTO: 0 10E9/L (ref 0–0.2)
BASOPHILS NFR BLD AUTO: 0.4 %
DEPRECATED S PYO AG THROAT QL EIA: NORMAL
DIFFERENTIAL METHOD BLD: NORMAL
EOSINOPHIL # BLD AUTO: 0.1 10E9/L (ref 0–0.7)
EOSINOPHIL NFR BLD AUTO: 0.6 %
ERYTHROCYTE [DISTWIDTH] IN BLOOD BY AUTOMATED COUNT: 14.3 % (ref 10–15)
HCT VFR BLD AUTO: 39.4 % (ref 35–47)
HETEROPH AB SER QL: NEGATIVE
HGB BLD-MCNC: 13.2 G/DL (ref 11.7–15.7)
IMM GRANULOCYTES # BLD: 0 10E9/L (ref 0–0.4)
IMM GRANULOCYTES NFR BLD: 0.1 %
LYMPHOCYTES # BLD AUTO: 1.9 10E9/L (ref 0.8–5.3)
LYMPHOCYTES NFR BLD AUTO: 17.8 %
MCH RBC QN AUTO: 27.1 PG (ref 26.5–33)
MCHC RBC AUTO-ENTMCNC: 33.5 G/DL (ref 31.5–36.5)
MCV RBC AUTO: 81 FL (ref 78–100)
MONOCYTES # BLD AUTO: 1 10E9/L (ref 0–1.3)
MONOCYTES NFR BLD AUTO: 9.6 %
NEUTROPHILS # BLD AUTO: 7.7 10E9/L (ref 1.6–8.3)
NEUTROPHILS NFR BLD AUTO: 71.5 %
NRBC # BLD AUTO: 0 10*3/UL
NRBC BLD AUTO-RTO: 0 /100
PLATELET # BLD AUTO: 284 10E9/L (ref 150–450)
RBC # BLD AUTO: 4.87 10E12/L (ref 3.8–5.2)
SPECIMEN SOURCE: NORMAL
WBC # BLD AUTO: 10.8 10E9/L (ref 4–11)

## 2018-05-24 PROCEDURE — 99214 OFFICE O/P EST MOD 30 MIN: CPT | Performed by: NURSE PRACTITIONER

## 2018-05-24 PROCEDURE — G0463 HOSPITAL OUTPT CLINIC VISIT: HCPCS | Mod: 25

## 2018-05-24 PROCEDURE — 71046 X-RAY EXAM CHEST 2 VIEWS: CPT | Mod: TC

## 2018-05-24 PROCEDURE — 87081 CULTURE SCREEN ONLY: CPT | Performed by: FAMILY MEDICINE

## 2018-05-24 PROCEDURE — 86308 HETEROPHILE ANTIBODY SCREEN: CPT | Performed by: NURSE PRACTITIONER

## 2018-05-24 PROCEDURE — 25000132 ZZH RX MED GY IP 250 OP 250 PS 637: Performed by: NURSE PRACTITIONER

## 2018-05-24 PROCEDURE — 36415 COLL VENOUS BLD VENIPUNCTURE: CPT | Performed by: NURSE PRACTITIONER

## 2018-05-24 PROCEDURE — 87880 STREP A ASSAY W/OPTIC: CPT | Performed by: FAMILY MEDICINE

## 2018-05-24 PROCEDURE — 85025 COMPLETE CBC W/AUTO DIFF WBC: CPT | Performed by: NURSE PRACTITIONER

## 2018-05-24 RX ORDER — AZITHROMYCIN 250 MG/1
500 TABLET, FILM COATED ORAL ONCE
Status: COMPLETED | OUTPATIENT
Start: 2018-05-24 | End: 2018-05-24

## 2018-05-24 RX ORDER — AZITHROMYCIN 250 MG/1
TABLET, FILM COATED ORAL
Qty: 4 TABLET | Refills: 0 | Status: ON HOLD | OUTPATIENT
Start: 2018-05-24 | End: 2019-01-30

## 2018-05-24 RX ADMIN — AZITHROMYCIN 500 MG: 250 TABLET, FILM COATED ORAL at 22:44

## 2018-05-24 ASSESSMENT — ENCOUNTER SYMPTOMS
SORE THROAT: 1
SHORTNESS OF BREATH: 0
COUGH: 1
FEVER: 1
CHILLS: 1
FATIGUE: 1
ABDOMINAL PAIN: 0

## 2018-05-24 NOTE — ED AVS SNAPSHOT
[facility]    [address] [address]    Phone:  [phone]                                       Zara Aguilera   MRN: [id number]    Department:  [facility]   Date of Visit:  5/24/2018           Patient Information     Date Of Birth          2000        Your diagnoses for this visit were:     Bronchitis        You were seen by Nanci Ghosh NP.      Follow-up Information     Follow up with Kate Moyer MD. Schedule an appointment as soon as possible for a visit in 1 week.    Specialty:  Family Practice    Why:  for re-evaluation, sooner if needed    Contact information:    [address]  [facility] [address] [phone]          Follow up with [facility].    Specialty:  EMERGENCY MEDICINE    Why:  If symptoms worsen    Contact information:    [address] [address] [phone]    Additional information:    From [place]: Take [address]. Turn left at [address]. Turn left at the first stoplight on [address] [place] [address]. At the first stop sign, take a right onto [address]. Take a left into the parking lot and continue through until you reach the North enterance of the building.       From [place]: Take [address]. Take the [address] ramp towards [place]. Turn left onto [address]. Take a slight right onto [address]. Turn left at the first stoplight on [address]. At the first stop sign, take a right onto [address]. Take a left into the parking lot and continue through until you reach the North enterance of the building.       From [place]: Take [address]. Take a right at [address]. At the first stop sign, take a right onto [address]. Take a left into the parking lot and continue through until you reach the North enterance of the building.         Discharge Instructions         Bronchitis, Antibiotic Treatment  (Adult)    Bronchitis is an infection of the air passages (bronchial tubes) in your lungs. It often occurs when you have a cold. This illness is contagious during the first few days and is spread through the air by coughing and sneezing, or by direct contact (touching the sick person and then touching your own eyes, nose, or mouth).  Symptoms of bronchitis include cough with mucus (phlegm) and low-grade fever. Bronchitis usually lasts 7 to 14 days. Mild cases can be treated with simple home remedies. More severe infection is treated with an antibiotic.  Home care  Follow these guidelines when caring for yourself at home:    If your symptoms are severe, rest at home for the first 2 to 3 days. When you go back to your usual activities, don't let yourself get too tired.    Do not smoke. Also avoid being exposed to secondhand smoke.    You may use over-the-counter medicines to control fever or pain, unless another medicine was prescribed. If you have chronic liver or kidney disease or have ever had a stomach ulcer or gastrointestinal bleeding, talk with your healthcare provider before using these medicines. Also talk to your provider if you are taking medicine to prevent blood clots. Aspirin should never be given to anyone younger than 18 years of age who is ill with a viral infection or fever. It may cause severe liver or brain damage.    Your appetite may be poor, so a light diet is fine. Avoid dehydration by drinking 6 to 8 glasses of fluids per day (such as water, soft drinks, sports drinks, juices, tea, or soup). Extra fluids will help loosen secretions in the nose and lungs.    Over-the-counter cough, cold, and sore-throat medicines will not shorten the length of the illness, but they may be helpful to reduce symptoms. (Note: Do not use decongestants if you have high blood pressure.)    Finish all antibiotic medicine. Do this even if you are feeling better after only a few days.  Follow-up care  Follow up with  your healthcare provider in 3-5 days. If you had an X-ray or ECG (electrocardiogram), a specialist will review it.                 Review of your medicines      START taking        Dose / Directions Last dose taken    azithromycin 250 MG tablet   Commonly known as:  ZITHROMAX Z-MUSA   Quantity:  4 tablet        Two tablets on the first day, then one tablet daily for the next 4 days   Refills:  0          Our records show that you are taking the medicines listed below. If these are incorrect, please call your family doctor or clinic.        Dose / Directions Last dose taken    IBUPROFEN PO   Dose:  600 mg        Take 600 mg by mouth   Refills:  0                Prescriptions were sent or printed at these locations (1 Prescription)                   [id number] - [place], [place] - [address]   [address] , [place] [address]    Telephone:  [phone]   Fax:  [phone]   Hours:                  E-Prescribed (1 of 1)         azithromycin (ZITHROMAX Z-MUSA) 250 MG tablet                Procedures and tests performed during your visit     Beta strep group A culture    CBC with platelets differential    Mononucleosis screen    Rapid strep screen    XR Chest 2 Views      Orders Needing Specimen Collection     None      Pending Results     Date and Time Order Name Status Description    5/24/2018 2204 XR Chest 2 Views In process     5/24/2018 2148 Beta strep group A culture In process             Pending Culture Results     Date and Time Order Name Status Description    5/24/2018 2148 Beta strep group A culture In process             Thank you for choosing [facility]       Thank you for choosing [facility] for your care. Our goal is always to provide you with excellent care. Hearing back from our patients is one way we can continue to improve our services. Please take a few minutes to complete the written survey that you may receive in the mail after you visit with us. Thank you!        MyChart Information     [employer]  "lets you send messages to your doctor, view your test results, renew your prescriptions, schedule appointments and more. To sign up, go to [email or web address] . Click on \"Log in\" on the left side of the screen, which will take you to the Welcome page. Then click on \"Sign up Now\" on the right side of the page.     You will be asked to enter the access code listed below, as well as some personal information. Please follow the directions to create your username and password.     Your access code is: [email or web address]  Expires: 2018  1:19 PM     Your access code will  in 90 days. If you need help or a new code, please call your [facility] clinic or [phone].        Care EveryWhere ID     This is your Care EveryWhere ID. This could be used by other organizations to access your [facility] medical records  [id number]        Equal Access to Services     CONOR TAYLOR : Joel Cornejo, callie arteaga, juan antonio ramey, ramana martínez . So [facility] [phone].    ATENCIÓN: Si habla español, tiene a silva disposición servicios gratuitos de asistencia lingüística. Llame al [phone].    We comply with applicable federal civil rights laws and [place] laws. We do not discriminate on the basis of race, color, national origin, age, disability, sex, sexual orientation, or gender identity.            After Visit Summary       This is your record. Keep this with you and show to your community pharmacist(s) and doctor(s) at your next visit.                  "

## 2018-05-24 NOTE — ED AVS SNAPSHOT
[facility]    [address] [address]    Phone:  [phone]                                       Zara Aguilera   MRN: [id number]    Department:  [facility]   Date of Visit:  5/24/2018           After Visit Summary Signature Page     I have received my discharge instructions, and my questions have been answered. I have discussed any challenges I see with this plan with the nurse or doctor.    ..........................................................................................................................................  Patient/Patient Representative Signature      ..........................................................................................................................................  Patient Representative Print Name and Relationship to Patient    ..................................................               ................................................  Date                                            Time    ..........................................................................................................................................  Reviewed by Signature/Title    ...................................................              ..............................................  Date                                                            Time

## 2018-05-25 NOTE — ED TRIAGE NOTES
Pt presents today with mom for c/o fever,  sore throat, cough, body aches, headaches and low back pain, has been sick for about 4-5 days and not getting any better, mom is concerned about dehydration.

## 2018-05-25 NOTE — DISCHARGE INSTRUCTIONS
Bronchitis, Antibiotic Treatment (Adult)    Bronchitis is an infection of the air passages (bronchial tubes) in your lungs. It often occurs when you have a cold. This illness is contagious during the first few days and is spread through the air by coughing and sneezing, or by direct contact (touching the sick person and then touching your own eyes, nose, or mouth).  Symptoms of bronchitis include cough with mucus (phlegm) and low-grade fever. Bronchitis usually lasts 7 to 14 days. Mild cases can be treated with simple home remedies. More severe infection is treated with an antibiotic.  Home care  Follow these guidelines when caring for yourself at home:    If your symptoms are severe, rest at home for the first 2 to 3 days. When you go back to your usual activities, don't let yourself get too tired.    Do not smoke. Also avoid being exposed to secondhand smoke.    You may use over-the-counter medicines to control fever or pain, unless another medicine was prescribed. If you have chronic liver or kidney disease or have ever had a stomach ulcer or gastrointestinal bleeding, talk with your healthcare provider before using these medicines. Also talk to your provider if you are taking medicine to prevent blood clots. Aspirin should never be given to anyone younger than 18 years of age who is ill with a viral infection or fever. It may cause severe liver or brain damage.    Your appetite may be poor, so a light diet is fine. Avoid dehydration by drinking 6 to 8 glasses of fluids per day (such as water, soft drinks, sports drinks, juices, tea, or soup). Extra fluids will help loosen secretions in the nose and lungs.    Over-the-counter cough, cold, and sore-throat medicines will not shorten the length of the illness, but they may be helpful to reduce symptoms. (Note: Do not use decongestants if you have high blood pressure.)    Finish all antibiotic medicine. Do this even if you are feeling better after only a few  days.  Follow-up care  Follow up with your healthcare provider in 3-5 days. If you had an X-ray or ECG (electrocardiogram), a specialist will review it.

## 2018-05-26 LAB
BACTERIA SPEC CULT: NORMAL
SPECIMEN SOURCE: NORMAL

## 2018-05-27 ENCOUNTER — HOSPITAL ENCOUNTER (EMERGENCY)
Facility: HOSPITAL | Age: 18
Discharge: HOME | End: 2018-05-27
Attending: NURSE PRACTITIONER | Admitting: NURSE PRACTITIONER
Payer: COMMERCIAL

## 2018-05-27 VITALS
RESPIRATION RATE: 16 BRPM | OXYGEN SATURATION: 96 % | SYSTOLIC BLOOD PRESSURE: 163 MMHG | TEMPERATURE: 96.9 F | DIASTOLIC BLOOD PRESSURE: 75 MMHG

## 2018-05-27 DIAGNOSIS — J02.9 VIRAL PHARYNGITIS: ICD-10-CM

## 2018-05-27 LAB
ANION GAP SERPL CALCULATED.3IONS-SCNC: 9 MMOL/L (ref 3–14)
BASOPHILS # BLD AUTO: 0 10E9/L (ref 0–0.2)
BASOPHILS NFR BLD AUTO: 0.4 %
BUN SERPL-MCNC: 8 MG/DL (ref 7–30)
CALCIUM SERPL-MCNC: 9.3 MG/DL (ref 8.5–10.1)
CHLORIDE SERPL-SCNC: 108 MMOL/L (ref 96–110)
CO2 SERPL-SCNC: 23 MMOL/L (ref 20–32)
CREAT SERPL-MCNC: 0.53 MG/DL (ref 0.5–1)
DEPRECATED S PYO AG THROAT QL EIA: NORMAL
DIFFERENTIAL METHOD BLD: NORMAL
EOSINOPHIL # BLD AUTO: 0.1 10E9/L (ref 0–0.7)
EOSINOPHIL NFR BLD AUTO: 1.5 %
ERYTHROCYTE [DISTWIDTH] IN BLOOD BY AUTOMATED COUNT: 14.2 % (ref 10–15)
GFR SERPL CREATININE-BSD FRML MDRD: >90 ML/MIN/1.7M2
GLUCOSE SERPL-MCNC: 92 MG/DL (ref 70–99)
HCT VFR BLD AUTO: 40.2 % (ref 35–47)
HETEROPH AB SER QL: NEGATIVE
HGB BLD-MCNC: 13.5 G/DL (ref 11.7–15.7)
IMM GRANULOCYTES # BLD: 0 10E9/L (ref 0–0.4)
IMM GRANULOCYTES NFR BLD: 0.2 %
LYMPHOCYTES # BLD AUTO: 2.6 10E9/L (ref 0.8–5.3)
LYMPHOCYTES NFR BLD AUTO: 28.4 %
MCH RBC QN AUTO: 26.9 PG (ref 26.5–33)
MCHC RBC AUTO-ENTMCNC: 33.6 G/DL (ref 31.5–36.5)
MCV RBC AUTO: 80 FL (ref 78–100)
MONOCYTES # BLD AUTO: 0.5 10E9/L (ref 0–1.3)
MONOCYTES NFR BLD AUTO: 5.9 %
NEUTROPHILS # BLD AUTO: 5.8 10E9/L (ref 1.6–8.3)
NEUTROPHILS NFR BLD AUTO: 63.6 %
NRBC # BLD AUTO: 0 10*3/UL
NRBC BLD AUTO-RTO: 0 /100
PLATELET # BLD AUTO: 322 10E9/L (ref 150–450)
POTASSIUM SERPL-SCNC: 4 MMOL/L (ref 3.4–5.3)
RBC # BLD AUTO: 5.01 10E12/L (ref 3.8–5.2)
SODIUM SERPL-SCNC: 140 MMOL/L (ref 133–144)
SPECIMEN SOURCE: NORMAL
WBC # BLD AUTO: 9.1 10E9/L (ref 4–11)

## 2018-05-27 PROCEDURE — 99213 OFFICE O/P EST LOW 20 MIN: CPT | Performed by: NURSE PRACTITIONER

## 2018-05-27 PROCEDURE — 80048 BASIC METABOLIC PNL TOTAL CA: CPT | Performed by: NURSE PRACTITIONER

## 2018-05-27 PROCEDURE — 85025 COMPLETE CBC W/AUTO DIFF WBC: CPT | Performed by: NURSE PRACTITIONER

## 2018-05-27 PROCEDURE — G0463 HOSPITAL OUTPT CLINIC VISIT: HCPCS

## 2018-05-27 PROCEDURE — 36415 COLL VENOUS BLD VENIPUNCTURE: CPT | Performed by: NURSE PRACTITIONER

## 2018-05-27 PROCEDURE — 86308 HETEROPHILE ANTIBODY SCREEN: CPT | Performed by: NURSE PRACTITIONER

## 2018-05-27 PROCEDURE — 87081 CULTURE SCREEN ONLY: CPT | Performed by: FAMILY MEDICINE

## 2018-05-27 PROCEDURE — 87880 STREP A ASSAY W/OPTIC: CPT | Performed by: FAMILY MEDICINE

## 2018-05-27 ASSESSMENT — ENCOUNTER SYMPTOMS
SHORTNESS OF BREATH: 0
APPETITE CHANGE: 0
MUSCULOSKELETAL NEGATIVE: 1
WHEEZING: 0
RHINORRHEA: 0
COUGH: 1
FEVER: 0
ABDOMINAL PAIN: 0
TROUBLE SWALLOWING: 0
SORE THROAT: 1

## 2018-05-27 NOTE — ED PROVIDER NOTES
"  History     Chief Complaint   Patient presents with     Pharyngitis     seen recently and put on abx for bronchitis. now mother thinks pt \"I'm pretty sure she has some crazy ass flu.\" Pt's only c/o is sore throat.      The history is provided by the patient. No  was used.     Zara Aguilera is a 17 year old female who presents with a sore throat. Was here 3-4 days ago and treated for bronchitis with a Zpak. Her cough, body aches and congestion are better but her sore throat is persistant.   Taking ibuprofen every 6 hours for pain. Eating and drinking fine. No fever.     Problem List:    Patient Active Problem List    Diagnosis Date Noted     Pain of left sacroiliac joint 08/06/2015     Priority: Medium     Vitamin D deficiency 08/03/2015     Priority: Medium     Laceration of left wrist, subsequent encounter 08/03/2015     Priority: Medium     Suicidal ideations 08/02/2015     Priority: Medium     Sleep disorder breathing 03/09/2015     Priority: Medium     Dysmenorrhea 07/01/2014     Priority: Medium     Suicidal ideation 05/08/2014     Priority: Medium     Environmental allergies      Priority: Medium     Suicide attempt      Priority: Medium        Past Medical History:    Past Medical History:   Diagnosis Date     Allergic rhinitis      Asthma      Environmental allergies      Reflux      Suicide attempt 3/15     Tonsillar and adenoid hypertrophy        Past Surgical History:    Past Surgical History:   Procedure Laterality Date     TONSILLECTOMY, ADENOIDECTOMY, COMBINED Bilateral 4/28/2015    Procedure: COMBINED TONSILLECTOMY, ADENOIDECTOMY;  Surgeon: Pinky Anglin MD;  Location: [facility]       Family History:    Family History   Problem Relation Age of Onset     Allergies Mother      Depression Mother        Social History:  Marital Status:  Single [1]  Social History   Substance Use Topics     Smoking status: Current Every Day Smoker     Packs/day: 0.50     Years: 2.00     Smokeless " tobacco: Never Used     Alcohol use No        Medications:      azithromycin (ZITHROMAX Z-MUSA) 250 MG tablet   IBUPROFEN PO         Review of Systems   Constitutional: Negative for appetite change and fever.   HENT: Positive for sore throat. Negative for congestion, ear pain, rhinorrhea and trouble swallowing.    Respiratory: Positive for cough (Much improved). Negative for shortness of breath and wheezing.    Gastrointestinal: Negative for abdominal pain.   Musculoskeletal: Negative.    Skin: Negative.        Physical Exam   BP: 163/75  Heart Rate: 90  Temp: 96.9  F (36.1  C)  Resp: 16  SpO2: 96 %      Physical Exam   Constitutional: She is oriented to person, place, and time. Vital signs are normal. She appears well-developed and well-nourished. She is active and cooperative. She does not appear ill. No distress.   HENT:   Head: Normocephalic and atraumatic.   Right Ear: Tympanic membrane and external ear normal.   Left Ear: Tympanic membrane and external ear normal.   Nose: Nose normal.   Mouth/Throat: Uvula is midline, oropharynx is clear and moist and mucous membranes are normal. No oropharyngeal exudate.   Eyes: Conjunctivae and lids are normal. Right eye exhibits no discharge. Left eye exhibits no discharge. No scleral icterus.   Neck: Normal range of motion. Neck supple.   Cardiovascular: Normal rate, regular rhythm and normal heart sounds.    Pulmonary/Chest: Effort normal and breath sounds normal. She has no decreased breath sounds. She has no wheezes.   Musculoskeletal: Normal range of motion.   Lymphadenopathy:        Head (right side): No tonsillar and no occipital adenopathy present.        Head (left side): No tonsillar and no occipital adenopathy present.     She has no cervical adenopathy.   Neurological: She is alert and oriented to person, place, and time. Coordination normal.   Skin: Skin is warm and dry. She is not diaphoretic.   Psychiatric: She has a normal mood and affect.   Nursing note and  vitals reviewed.      ED Course     ED Course     Procedures         Results for orders placed or performed during the hospital encounter of 05/27/18 (from the past 24 hour(s))   Rapid strep screen   Result Value Ref Range    Specimen Description Throat     Rapid Strep A Screen       NEGATIVE: No Group A streptococcal antigen detected by immunoassay, await culture report.   CBC with platelets differential   Result Value Ref Range    WBC 9.1 4.0 - 11.0 10e9/L    RBC Count 5.01 3.8 - 5.2 10e12/L    Hemoglobin 13.5 11.7 - 15.7 g/dL    Hematocrit 40.2 35.0 - 47.0 %    MCV 80 78 - 100 fl    MCH 26.9 26.5 - 33.0 pg    MCHC 33.6 31.5 - 36.5 g/dL    RDW 14.2 10.0 - 15.0 %    Platelet Count 322 150 - 450 10e9/L    Diff Method Automated Method     % Neutrophils 63.6 %    % Lymphocytes 28.4 %    % Monocytes 5.9 %    % Eosinophils 1.5 %    % Basophils 0.4 %    % Immature Granulocytes 0.2 %    Nucleated RBCs 0 0 /100    Absolute Neutrophil 5.8 1.6 - 8.3 10e9/L    Absolute Lymphocytes 2.6 0.8 - 5.3 10e9/L    Absolute Monocytes 0.5 0.0 - 1.3 10e9/L    Absolute Eosinophils 0.1 0.0 - 0.7 10e9/L    Absolute Basophils 0.0 0.0 - 0.2 10e9/L    Abs Immature Granulocytes 0.0 0 - 0.4 10e9/L    Absolute Nucleated RBC 0.0    Basic metabolic panel   Result Value Ref Range    Sodium 140 133 - 144 mmol/L    Potassium 4.0 3.4 - 5.3 mmol/L    Chloride 108 96 - 110 mmol/L    Carbon Dioxide 23 20 - 32 mmol/L    Anion Gap 9 3 - 14 mmol/L    Glucose 92 70 - 99 mg/dL    Urea Nitrogen 8 7 - 30 mg/dL    Creatinine 0.53 0.50 - 1.00 mg/dL    GFR Estimate >90 >60 mL/min/1.7m2    GFR Estimate If Black >90 >60 mL/min/1.7m2    Calcium 9.3 8.5 - 10.1 mg/dL   Mononucleosis screen   Result Value Ref Range    Mononucleosis Screen Negative NEG^Negative       Medications - No data to display    Assessments & Plan (with Medical Decision Making)     I have reviewed the nursing notes.  I have reviewed the findings, diagnosis, plan and need for follow up with the  patient.  Rapid strep, mono are neg. CBC wnl.   Will treat as viral. Just finished a Zpak.   Given Epic educational materials.     New Prescriptions    No medications on file       Final diagnoses:   Viral pharyngitis     Plan: The rapid strep was negative, the strep culture is pending, we will call you with results in 24-48 hours and treat with antibiotics as needed  Warm salt water gargles several times per day  Ibuprofen and tylenol per package directions for pain/fever  Cepacol lozenges OTC per package directions  Increase fluids, wash hands frequently, rest  Patient verbally educated and given appropriate education sheets for their diagnoses and has no questions.  Follow up with your Primary Care provider if symptoms do not improve in 2-3 days    5/27/2018   [facility]     Nanci Ghosh NP  05/27/18 0741

## 2018-05-27 NOTE — DISCHARGE INSTRUCTIONS
Your mono is negative and your white count is normal. It is likely a virus causing your symptoms.   Supportive care is advised - continue with ibuprofen, gargle with salt water and drink lots of fluids.   You can also try claritin or benadryl if this is post nasal drip causing your pain.   Follow up with PCP next week for re-evaluation.     Self-Care for Sore Throats     Sore throats happen for many reasons, such as colds, allergies, and infections caused by viruses or bacteria. Your goal for self-care is to reduce your discomfort while giving your throat a chance to heal.  Moisten and soothe your throat  Tips include the following:    Try a sip of water first thing after waking up.    Keep your throat moist by drinking 6 or more glasses of clear liquids every day.    Run a cool-air humidifier in your room overnight.    Avoid cigarette smoke.     Suck on throat lozenges, cough drops, hard candy, ice chips, or frozen fruit-juice bars. Use the sugar-free versions if your diet or medical condition requires them.  Gargle to ease irritation  Gargling every hour or 2 can ease irritation. Try gargling with 1 of these solutions:    1/4 teaspoon of salt in 1/2 cup of warm water    An over-the-counter anesthetic gargle  Use medicine for more relief  Over-the-counter medicine can reduce sore throat symptoms. Ask your pharmacist if you have questions about which medicine to use:    Ease pain with anesthetic sprays. Aspirin or an aspirin substitute also helps. Remember, never give aspirin to anyone 18 or younger, or if you are already taking blood thinners.     For sore throats caused by allergies, try antihistamines to block the allergic reaction.    Remember: unless a sore throat is caused by a bacterial infection, antibiotics won t help you.  Prevent future sore throats  Prevention tips include the following:    Stop smoking or reduce contact with secondhand smoke. Smoke irritates the tender throat lining.    Limit contact  with pets and with allergy-causing substances, such as pollen and mold.    When you re around someone with a sore throat or cold, wash your hands often to keep viruses or bacteria from spreading.    Don t strain your vocal cords.

## 2018-05-27 NOTE — ED AVS SNAPSHOT
[facility]    [address] [address]    Phone:  [phone]                                       Zara Aguilera   MRN: [id number]    Department:  [facility]   Date of Visit:  5/27/2018           After Visit Summary Signature Page     I have received my discharge instructions, and my questions have been answered. I have discussed any challenges I see with this plan with the nurse or doctor.    ..........................................................................................................................................  Patient/Patient Representative Signature      ..........................................................................................................................................  Patient Representative Print Name and Relationship to Patient    ..................................................               ................................................  Date                                            Time    ..........................................................................................................................................  Reviewed by Signature/Title    ...................................................              ..............................................  Date                                                            Time

## 2018-05-27 NOTE — ED AVS SNAPSHOT
[facility]    [address] [place] [id number]    Phone:  [phone]                                       Zara Aguilera   MRN: [id number]    Department:  [facility]   Date of Visit:  5/27/2018           Patient Information     Date Of Birth          2000        Your diagnoses for this visit were:     Viral pharyngitis        You were seen by Nanci Ghosh NP.      Follow-up Information     Schedule an appointment as soon as possible for a visit with Kate Moyer MD.    Specialty:  Family Practice    Why:  for re-evaluation    Contact information:    [address] [place] [id number]  [phone]          Discharge Instructions       Your mono is negative and your white count is normal. It is likely a virus causing your symptoms.   Supportive care is advised - continue with ibuprofen, gargle with salt water and drink lots of fluids.   You can also try claritin or benadryl if this is post nasal drip causing your pain.   Follow up with PCP next week for re-evaluation.     Self-Care for Sore Throats     Sore throats happen for many reasons, such as colds, allergies, and infections caused by viruses or bacteria. Your goal for self-care is to reduce your discomfort while giving your throat a chance to heal.  Moisten and soothe your throat  Tips include the following:    Try a sip of water first thing after waking up.    Keep your throat moist by drinking 6 or more glasses of clear liquids every day.    Run a cool-air humidifier in your room overnight.    Avoid cigarette smoke.     Suck on throat lozenges, cough drops, hard candy, ice chips, or frozen fruit-juice bars. Use the sugar-free versions if your diet or medical condition requires them.  Gargle to ease irritation  Gargling every hour or 2 can ease irritation. Try gargling with 1 of these solutions:    1/4 teaspoon of salt in 1/2 cup of warm water    An over-the-counter anesthetic gargle  Use  medicine for more relief  Over-the-counter medicine can reduce sore throat symptoms. Ask your pharmacist if you have questions about which medicine to use:    Ease pain with anesthetic sprays. Aspirin or an aspirin substitute also helps. Remember, never give aspirin to anyone 18 or younger, or if you are already taking blood thinners.     For sore throats caused by allergies, try antihistamines to block the allergic reaction.    Remember: unless a sore throat is caused by a bacterial infection, antibiotics won t help you.  Prevent future sore throats  Prevention tips include the following:    Stop smoking or reduce contact with secondhand smoke. Smoke irritates the tender throat lining.    Limit contact with pets and with allergy-causing substances, such as pollen and mold.    When you re around someone with a sore throat or cold, wash your hands often to keep viruses or bacteria from spreading.    Don t strain your vocal cords.          Review of your medicines      Our records show that you are taking the medicines listed below. If these are incorrect, please call your family doctor or clinic.        Dose / Directions Last dose taken    azithromycin 250 MG tablet   Commonly known as:  ZITHROMAX Z-MUSA   Quantity:  4 tablet        Two tablets on the first day, then one tablet daily for the next 4 days   Refills:  0        IBUPROFEN PO   Dose:  600 mg        Take 600 mg by mouth   Refills:  0                Procedures and tests performed during your visit     Basic metabolic panel    Beta strep group A culture    CBC with platelets differential    Mononucleosis screen    Rapid strep screen      Orders Needing Specimen Collection     None      Pending Results     Date and Time Order Name Status Description    5/27/2018 0957 Beta strep group A culture In process             Pending Culture Results     Date and Time Order Name Status Description    5/27/2018 0957 Beta strep group A culture In process             Thank  "you for choosing [facility]       Thank you for choosing [facility] for your care. Our goal is always to provide you with excellent care. Hearing back from our patients is one way we can continue to improve our services. Please take a few minutes to complete the written survey that you may receive in the mail after you visit with us. Thank you!        [email or web address] Information     [facility] lets you send messages to your doctor, view your test results, renew your prescriptions, schedule appointments and more. To sign up, go to [email or web address] . Click on \"Log in\" on the left side of the screen, which will take you to the Welcome page. Then click on \"Sign up Now\" on the right side of the page.     You will be asked to enter the access code listed below, as well as some personal information. Please follow the directions to create your username and password.     Your access code is: [email or web address]  Expires: 2018  1:19 PM     Your access code will  in 90 days. If you need help or a new code, please call your [facility] clinic or [phone].        Care EveryWhere ID     This is your Care EveryWhere ID. This could be used by other organizations to access your [facility] medical records  [id number]        Equal Access to Services     CONOR TAYLOR : Joel estradao Chantal, waaxda luqadaha, qaybta kaalmada adeblancayada, ramana rodriguez. [facility] [phone].    ATENCIÓN: Si habla español, tiene a silva disposición servicios gratuitos de asistencia lingüística. Llame al [phone].    We comply with applicable federal civil rights laws and [place] laws. We do not discriminate on the basis of race, color, national origin, age, disability, sex, sexual orientation, or gender identity.            After Visit Summary       This is your record. Keep this with you and show to your community pharmacist(s) and doctor(s) at your next visit.                  "

## 2018-05-29 LAB
BACTERIA SPEC CULT: NORMAL
SPECIMEN SOURCE: NORMAL

## 2018-07-11 ENCOUNTER — TRANSFERRED RECORDS (OUTPATIENT)
Dept: HEALTH INFORMATION MANAGEMENT | Facility: CLINIC | Age: 18
End: 2018-07-11

## 2018-10-19 ENCOUNTER — HOSPITAL ENCOUNTER (OUTPATIENT)
Dept: ULTRASOUND IMAGING | Facility: HOSPITAL | Age: 18
Discharge: HOME | End: 2018-10-19
Attending: FAMILY MEDICINE | Admitting: FAMILY MEDICINE
Payer: COMMERCIAL

## 2018-10-19 DIAGNOSIS — Z34.02 ENCOUNTER FOR SUPERVISION OF NORMAL FIRST PREGNANCY IN SECOND TRIMESTER: ICD-10-CM

## 2018-10-19 PROCEDURE — 76805 OB US >/= 14 WKS SNGL FETUS: CPT | Mod: TC

## 2018-12-03 ENCOUNTER — HOSPITAL ENCOUNTER (OUTPATIENT)
Facility: HOSPITAL | Age: 18
Discharge: HOME | End: 2018-12-03
Attending: FAMILY MEDICINE | Admitting: FAMILY MEDICINE
Payer: COMMERCIAL

## 2018-12-03 VITALS
SYSTOLIC BLOOD PRESSURE: 124 MMHG | OXYGEN SATURATION: 98 % | RESPIRATION RATE: 16 BRPM | WEIGHT: 245 LBS | HEART RATE: 93 BPM | BODY MASS INDEX: 35.07 KG/M2 | TEMPERATURE: 98.4 F | DIASTOLIC BLOOD PRESSURE: 73 MMHG | HEIGHT: 70 IN

## 2018-12-03 PROCEDURE — G0463 HOSPITAL OUTPT CLINIC VISIT: HCPCS

## 2018-12-03 NOTE — DISCHARGE INSTRUCTIONS

## 2018-12-03 NOTE — IP AVS SNAPSHOT
[facility]    [address] [address]    Phone:  [phone]    Fax:  [phone]                                       After Visit Summary   12/3/2018    Zara Aguilera    MRN: [id number]           After Visit Summary Signature Page     I have received my discharge instructions, and my questions have been answered. I have discussed any challenges I see with this plan with the nurse or doctor.    ..........................................................................................................................................  Patient/Patient Representative Signature      ..........................................................................................................................................  Patient Representative Print Name and Relationship to Patient    ..................................................               ................................................  Date                                   Time    ..........................................................................................................................................  Reviewed by Signature/Title    ...................................................              ..............................................  Date                                               Time          22EPIC Rev 08/18

## 2018-12-03 NOTE — IP AVS SNAPSHOT
MRN:[id number]                      After Visit Summary   12/3/2018    Zara Aguilera    MRN: [id number]           Thank you!     Thank you for choosing [facility] for your care. Our goal is always to provide you with excellent care. Hearing back from our patients is one way we can continue to improve our services. Please take a few minutes to complete the written survey that you may receive in the mail after you visit with us. Thank you!        Patient Information     Date Of Birth          2000        About your hospital stay     You were admitted on:  December 3, 2018 You last received care in the:  [facility]    You were discharged on:  December 3, 2018       Who to Call     For medical emergencies, please call 911.  For non-urgent questions about your medical care, please call your primary care provider or clinic, [phone]          Attending Provider     Provider Specialty    Armando Bazzi MD Family Practice       Primary Care Provider Office Phone # Fax #    Kate Moyer [phone]      Further instructions from your care team       Discharge Instructions for Undelivered Patients    Diet:  * Drink 8 to 12 glasses of liquids (milk, juice, water) every day  * You may eat meals and snacks.    Activity:  * Count fetal kicks every day.  * Call your doctor if your baby is moving less than usual.    Call your provider if you notice:  * Swelling in your face or increased swelling in your hands or legs.  * Headaches that are not relieved by Tylenol (acetaminophen).  * Changes in your vision (blurring; seeing spots or stars).  * Nausea (sick to your stomach) and vomiting (throwing up).  * Weight gain of 5 pounds per week.  * Heartburn that doesn't go away.  * Signs of bladder infection: Pain when you urinate (use the toilet), needing to go more often or more urgently.  * The bag of estrada (membrane) breaks, or you notice leaking in your underwear.  * Bright  "red blood in your underwear.  * Abdominal (lower belly) or stomach pain.  * For first baby: Contractions (tightenings) less than 5 minutes apart for one hour or more.  * Second (plus) baby: Contractions (tightenings) less than 10 minutes apart and getting stronger.  * Increase or change in vaginal discharge (note the color and amount).      [facility]: [phone]        Pending Results     No orders found from 2018 to 2018.            Admission Information     Date & Time Provider Department Dept. Phone    12/3/2018 Armando Bazzi MD [facility] [phone]      Your Vitals Were     Blood Pressure Pulse Temperature Respirations Height Weight    124/73 93 98.4  F (36.9  C) (Oral) 16 1.778 m (5' 10\") 111.1 kg (245 lb)    Pulse Oximetry BMI (Body Mass Index)                98% 35.15 kg/m2          [facility] Information     [employer] lets you send messages to your doctor, view your test results, renew your prescriptions, schedule appointments and more. To sign up, go to [email or web address] . Click on \"Log in\" on the left side of the screen, which will take you to the Welcome page. Then click on \"Sign up Now\" on the right side of the page.     You will be asked to enter the access code listed below, as well as some personal information. Please follow the directions to create your username and password.     Your access code is: [email or web address]  Expires: 3/3/2019 11:45 AM     Your access code will  in 90 days. If you need help or a new code, please call your [facility] clinic or [phone].        Care EveryWhere ID     This is your Care EveryWhere ID. This could be used by other organizations to access your [facility] medical records  [id number]        Equal Access to Services     SWAPNA TAYLOR : Joel Cornejo, callie arteaga, ramana stevens. [facility] [phone].    ATENCIÓN: Si rosemarie emerson silva " disposición servicios gratuitos de asistencia lingüística. Nahum mccormack [phone].    We comply with applicable federal civil rights laws and [place] laws. We do not discriminate on the basis of race, color, national origin, age, disability, sex, sexual orientation, or gender identity.               Review of your medicines      UNREVIEWED medicines. Ask your doctor about these medicines        Dose / Directions    IBUPROFEN PO        Dose:  600 mg   Take 600 mg by mouth   Refills:  0                Protect others around you: Learn how to safely use, store and throw away your medicines at www.disposemymeds.org.             Medication List: This is a list of all your medications and when to take them. Check marks below indicate your daily home schedule. Keep this list as a reference.      Medications           Morning Afternoon Evening Bedtime As Needed    IBUPROFEN PO   Take 600 mg by mouth

## 2018-12-03 NOTE — PLAN OF CARE
"Zara Aguilera is a 18 year old  and Unknown patient came in complaining of Fall    Patient Active Problem List   Diagnosis     Suicide attempt (H)     Environmental allergies     Suicidal ideation     Dysmenorrhea     Sleep disorder breathing     Suicidal ideations     Vitamin D deficiency     Laceration of left wrist, subsequent encounter     Pain of left sacroiliac joint     Encounter for triage in pregnant patient       Pt discharged to home at 11:57 AM and encouraged to rest and drink plenty of fluids.  Pt told to call/return if bleeding more than spotting, unresolved or worsening pain, decreased fetal movement, water breaks, contractions 3-5 minutes apart that she has to breath through.     Nursing education on s/s of labor  provided. Self-management instructions reviewed. AVS given and signed. All questions answered and patient verbalizes understanding.    /73  Pulse 93  Temp 98.4  F (36.9  C) (Oral)  Resp 16  Ht 1.778 m (5' 10\")  Wt 111.1 kg (245 lb)  SpO2 98%  BMI 35.15 kg/m2    Cervical status: not examined  Fetal Assessment: Reactive    Discharge support:  Independent-Alone    Obstetric History       T0      L0     SAB0   TAB0   Ectopic0   Multiple0   Live Births0       # Outcome Date GA Lbr Presley/2nd Weight Sex Delivery Anes PTL Lv   1 Current                   Roselyn Cruz    "

## 2018-12-18 ENCOUNTER — HOSPITAL ENCOUNTER (EMERGENCY)
Facility: HOSPITAL | Age: 18
End: 2018-12-18
Payer: COMMERCIAL

## 2018-12-18 ENCOUNTER — HOSPITAL ENCOUNTER (OUTPATIENT)
Facility: HOSPITAL | Age: 18
Discharge: HOME | End: 2018-12-18
Attending: FAMILY MEDICINE | Admitting: FAMILY MEDICINE
Payer: COMMERCIAL

## 2018-12-18 VITALS
DIASTOLIC BLOOD PRESSURE: 62 MMHG | BODY MASS INDEX: 35.22 KG/M2 | WEIGHT: 246 LBS | TEMPERATURE: 97.9 F | HEIGHT: 70 IN | SYSTOLIC BLOOD PRESSURE: 124 MMHG | OXYGEN SATURATION: 98 % | HEART RATE: 85 BPM | RESPIRATION RATE: 18 BRPM

## 2018-12-18 LAB
ALBUMIN UR-MCNC: 10 MG/DL
AMPHETAMINES UR QL SCN: NEGATIVE
APPEARANCE UR: CLEAR
BACTERIA #/AREA URNS HPF: ABNORMAL /HPF
BARBITURATES UR QL: NEGATIVE
BENZODIAZ UR QL: NEGATIVE
BILIRUB UR QL STRIP: NEGATIVE
CANNABINOIDS UR QL SCN: NEGATIVE
COCAINE UR QL: NEGATIVE
COLOR UR AUTO: YELLOW
FIBRONECTIN FETAL VAG QL: NEGATIVE
GLUCOSE UR STRIP-MCNC: NEGATIVE MG/DL
HGB UR QL STRIP: ABNORMAL
KETONES UR STRIP-MCNC: NEGATIVE MG/DL
LEUKOCYTE ESTERASE UR QL STRIP: NEGATIVE
METHADONE UR QL SCN: NEGATIVE
MUCOUS THREADS #/AREA URNS LPF: PRESENT /LPF
NITRATE UR QL: NEGATIVE
OPIATES UR QL SCN: NEGATIVE
PCP UR QL SCN: NEGATIVE
PH UR STRIP: 6 PH (ref 4.7–8)
RBC #/AREA URNS AUTO: 1 /HPF (ref 0–2)
SOURCE: ABNORMAL
SP GR UR STRIP: 1.02 (ref 1–1.03)
SQUAMOUS #/AREA URNS AUTO: 1 /HPF (ref 0–1)
UROBILINOGEN UR STRIP-MCNC: NORMAL MG/DL (ref 0–2)
WBC #/AREA URNS AUTO: 3 /HPF (ref 0–5)

## 2018-12-18 PROCEDURE — 82731 ASSAY OF FETAL FIBRONECTIN: CPT | Performed by: FAMILY MEDICINE

## 2018-12-18 PROCEDURE — 81001 URINALYSIS AUTO W/SCOPE: CPT | Mod: 59 | Performed by: FAMILY MEDICINE

## 2018-12-18 PROCEDURE — G0463 HOSPITAL OUTPT CLINIC VISIT: HCPCS

## 2018-12-18 PROCEDURE — 80307 DRUG TEST PRSMV CHEM ANLYZR: CPT | Performed by: FAMILY MEDICINE

## 2018-12-18 ASSESSMENT — MIFFLIN-ST. JEOR: SCORE: 1966.1

## 2018-12-19 NOTE — PLAN OF CARE
OB Triage Note  Zara Aguilera  MRN: [id number]  Gestational Age: Unknown      Zara Aguilera presents for Vaginal bleeding (sign/symptom/concern).      States melina since NA.  Rates pain at 0/10.  Bleeding: spotting x1 at home when patient wiped- no further vaginal bleeding noted.  Denies LOF.  Reports {AMOUNT     Dr. Bazzi notified of arrival and condition.  Oriented patient to surroundings. Call light within reach.     FHT: 125  NST Start Time:  NST Stop Time:  NST: Reactive .  Uterine Assessment:Contractions: None    Plan:  -Initial NST, then fetal/uterine monitoring per MD/patient plan.  FFN and UA.  Then call provider back with results.  -Nursing education on  labor provided.

## 2018-12-19 NOTE — DISCHARGE INSTRUCTIONS

## 2018-12-19 NOTE — PLAN OF CARE
Dr. Bazzi reviewed lab results.  Pt ok to discharge.  No further bleeding noted when patient went to the bathroom.  Denies pain/discomfort.

## 2019-01-30 ENCOUNTER — HOSPITAL ENCOUNTER (OUTPATIENT)
Facility: HOSPITAL | Age: 19
Discharge: HOME | End: 2019-01-30
Attending: FAMILY MEDICINE | Admitting: FAMILY MEDICINE
Payer: COMMERCIAL

## 2019-01-30 ENCOUNTER — TRANSFERRED RECORDS (OUTPATIENT)
Dept: HEALTH INFORMATION MANAGEMENT | Facility: CLINIC | Age: 19
End: 2019-01-30

## 2019-01-30 VITALS
HEART RATE: 101 BPM | SYSTOLIC BLOOD PRESSURE: 134 MMHG | RESPIRATION RATE: 19 BRPM | DIASTOLIC BLOOD PRESSURE: 78 MMHG | OXYGEN SATURATION: 97 % | TEMPERATURE: 98.2 F

## 2019-01-30 PROBLEM — Z36.89 ENCOUNTER FOR TRIAGE IN PREGNANT PATIENT: Status: ACTIVE | Noted: 2018-12-03

## 2019-01-30 LAB
ALBUMIN UR-MCNC: 10 MG/DL
APPEARANCE UR: ABNORMAL
BACTERIA #/AREA URNS HPF: ABNORMAL /HPF
BILIRUB UR QL STRIP: NEGATIVE
COLOR UR AUTO: YELLOW
GLUCOSE UR STRIP-MCNC: NEGATIVE MG/DL
HGB UR QL STRIP: NEGATIVE
KETONES UR STRIP-MCNC: NEGATIVE MG/DL
LEUKOCYTE ESTERASE UR QL STRIP: ABNORMAL
MUCOUS THREADS #/AREA URNS LPF: PRESENT /LPF
NITRATE UR QL: NEGATIVE
PH UR STRIP: 6.5 PH (ref 4.7–8)
RBC #/AREA URNS AUTO: <1 /HPF (ref 0–2)
RUPTURE OF FETAL MEMBRANES BY ROM PLUS: NEGATIVE
SOURCE: ABNORMAL
SP GR UR STRIP: 1.03 (ref 1–1.03)
SQUAMOUS #/AREA URNS AUTO: 2 /HPF (ref 0–1)
UROBILINOGEN UR STRIP-MCNC: NORMAL MG/DL (ref 0–2)
WBC #/AREA URNS AUTO: 6 /HPF (ref 0–5)

## 2019-01-30 PROCEDURE — 59025 FETAL NON-STRESS TEST: CPT

## 2019-01-30 PROCEDURE — G0463 HOSPITAL OUTPT CLINIC VISIT: HCPCS | Mod: 25

## 2019-01-30 PROCEDURE — 84112 EVAL AMNIOTIC FLUID PROTEIN: CPT | Performed by: FAMILY MEDICINE

## 2019-01-30 PROCEDURE — 81001 URINALYSIS AUTO W/SCOPE: CPT | Performed by: FAMILY MEDICINE

## 2019-01-30 NOTE — DISCHARGE INSTRUCTIONS

## 2019-01-30 NOTE — PLAN OF CARE
Zara Aguilera is a 18 year old  and Unknown patient came in complaining of No chief complaint on file.    Patient Active Problem List   Diagnosis     Suicide attempt (H)     Environmental allergies     Suicidal ideation     Dysmenorrhea     Sleep disorder breathing     Suicidal ideations     Vitamin D deficiency     Laceration of left wrist, subsequent encounter     Pain of left sacroiliac joint     Encounter for triage in pregnant patient       Pt discharged to home at 5:02 PM and encouraged to rest and drink plenty of fluids.  Pt told to call/return if bleeding more than spotting, water breaks, contractions 3-5 minutes apart that she has to breath through.     Nursing education on when to return to the hospital provided. Self-management instructions reviewed.  No New medications or therapies reviewed. AVS given and signed. All questions answered and patient verbalizes understanding.    /78   Pulse 101   Temp 98.2  F (36.8  C) (Oral)   Resp 19   SpO2 97%     Cervical status: not examined  Fetal Assessment: Reactive    Discharge support:  Family/Friend Support    Obstetric History       T0      L0     SAB0   TAB0   Ectopic0   Multiple0   Live Births0       # Outcome Date GA Lbr Presley/2nd Weight Sex Delivery Anes PTL Lv   1 Current                   Elizabeth Calvillo

## 2019-01-30 NOTE — PLAN OF CARE
OB Triage Note  Zara Aguilera  MRN: [id number]  Gestational Age: Unknown      Zara Aguilera presents for reports that she felt a gush of fluid after she went to the bathroom. And reports that she is feeling cramps on the top of her stomach. (sign/symptom/concern).       Patinet denies bleeding, or pain.    Dr. Bazzi notified of arrival and condition.  Oriented patient to surroundings. Call light within reach.     FHT: 120  NST Start Time:1600  NST Stop Time:1630  NST: Reactive.      Plan:  -Initial NST, then fetal/uterine monitoring per MD/patient plan.  -Sterile vaginal exam/sterile speculum exam.  -Nursing education on when to return to the hospital provided.

## 2019-01-31 NOTE — PROGRESS NOTES
Fetal Non-Stress Test Results    NST Ordered By: Armando Bazzi   NST Medical Indication: rule out labor    NST Start & Stop Times  NST Start Time: 1600  NST Stop Time: 1630          NST Results  Fetus A   Baseline Rate: 125  Accelerations: Present  Decelerations: None  Interpretation: reactive

## 2019-02-13 ENCOUNTER — TRANSFERRED RECORDS (OUTPATIENT)
Dept: HEALTH INFORMATION MANAGEMENT | Facility: CLINIC | Age: 19
End: 2019-02-13

## 2019-02-21 ENCOUNTER — HOSPITAL ENCOUNTER (EMERGENCY)
Facility: HOSPITAL | Age: 19
Discharge: HOME | End: 2019-02-21
Attending: NURSE PRACTITIONER | Admitting: NURSE PRACTITIONER
Payer: COMMERCIAL

## 2019-02-21 VITALS
OXYGEN SATURATION: 97 % | DIASTOLIC BLOOD PRESSURE: 91 MMHG | TEMPERATURE: 98.2 F | RESPIRATION RATE: 16 BRPM | SYSTOLIC BLOOD PRESSURE: 143 MMHG

## 2019-02-21 DIAGNOSIS — J06.9 UPPER RESPIRATORY TRACT INFECTION, UNSPECIFIED TYPE: ICD-10-CM

## 2019-02-21 LAB
DEPRECATED S PYO AG THROAT QL EIA: NORMAL
SPECIMEN SOURCE: NORMAL

## 2019-02-21 PROCEDURE — 99213 OFFICE O/P EST LOW 20 MIN: CPT | Mod: Z6 | Performed by: NURSE PRACTITIONER

## 2019-02-21 PROCEDURE — 87880 STREP A ASSAY W/OPTIC: CPT | Performed by: FAMILY MEDICINE

## 2019-02-21 PROCEDURE — 87081 CULTURE SCREEN ONLY: CPT | Performed by: FAMILY MEDICINE

## 2019-02-21 PROCEDURE — G0463 HOSPITAL OUTPT CLINIC VISIT: HCPCS

## 2019-02-21 ASSESSMENT — ENCOUNTER SYMPTOMS
SORE THROAT: 1
COUGH: 1
FEVER: 0
SHORTNESS OF BREATH: 0
CHILLS: 0
NAUSEA: 0
VOMITING: 0
MYALGIAS: 1
FATIGUE: 0
ARTHRALGIAS: 1
APPETITE CHANGE: 0

## 2019-02-21 NOTE — ED PROVIDER NOTES
History     Chief Complaint   Patient presents with     Pharyngitis     c/o sore throat and cough     HPI  Zara Ibarra is a 18 year old female who presents today with a CC of cough and sore throat x 1 week.  No fevers.  Appetite has been fair, she has been drinking fluids well.  She has been taking tylenol for symptoms - took it only once yesterday.  Has not taken any ibuprofen.  No known exposures to ill contacts.  No history of asthma or reactive airway.  She is a current smoker.  She is 38 weeks pregnant.  She has been feeling baby move.  No vaginal bleeding or cramping, no pregnancy concerns.      Allergies:  Allergies   Allergen Reactions     No Clinical Screening - See Comments      Red 40 & Cherry dye. Hives and difficulty breathing.      Red Dye      Red 40       Problem List:    Patient Active Problem List    Diagnosis Date Noted     Encounter for triage in pregnant patient 12/03/2018     Priority: Medium     Pain of left sacroiliac joint 08/06/2015     Priority: Medium     Vitamin D deficiency 08/03/2015     Priority: Medium     Laceration of left wrist, subsequent encounter 08/03/2015     Priority: Medium     Suicidal ideations 08/02/2015     Priority: Medium     Sleep disorder breathing 03/09/2015     Priority: Medium     Dysmenorrhea 07/01/2014     Priority: Medium     Suicidal ideation 05/08/2014     Priority: Medium     Environmental allergies      Priority: Medium     Suicide attempt (H)      Priority: Medium        Past Medical History:    Past Medical History:   Diagnosis Date     Allergic rhinitis      Asthma      Environmental allergies      Reflux      Suicide attempt (H) 3/15     Tonsillar and adenoid hypertrophy        Past Surgical History:    Past Surgical History:   Procedure Laterality Date     TONSILLECTOMY, ADENOIDECTOMY, COMBINED Bilateral 4/28/2015    Procedure: COMBINED TONSILLECTOMY, ADENOIDECTOMY;  Surgeon: Pinky Anglin MD;  Location: [facility]       Family History:     Family History   Problem Relation Age of Onset     Allergies Mother      Depression Mother        Social History:  Marital Status:  Single [1]  Social History     Tobacco Use     Smoking status: Former Smoker     Packs/day: 0.50     Years: 2.00     Pack years: 1.00     Smokeless tobacco: Never Used     Tobacco comment: Called pt per provider and pt stated not using.   Substance Use Topics     Alcohol use: No     Alcohol/week: 0.0 oz     Drug use: No     Comment: Called pt and pt stated she is not currently using.         Medications:      No current outpatient medications on file.      Review of Systems   Constitutional: Negative for appetite change, chills, fatigue and fever.   HENT: Positive for congestion and sore throat.    Respiratory: Positive for cough. Negative for shortness of breath.    Gastrointestinal: Negative for nausea and vomiting.   Musculoskeletal: Positive for arthralgias and myalgias.   Neurological: Positive for headaches (intermittent, none now).       Physical Exam   BP: 143/91  Heart Rate: 90  Temp: 98.2  F (36.8  C)  Resp: 16  SpO2: 97 %      Physical Exam   Constitutional: She appears well-developed. She is cooperative. She does not appear ill.   HENT:   Head: Normocephalic and atraumatic.   Cardiovascular: Normal rate and regular rhythm.   Pulmonary/Chest: Effort normal and breath sounds normal.   Abdominal:   FHT's 114   Neurological: She is alert.   Skin: Skin is warm and dry.   Psychiatric: She has a normal mood and affect. Her behavior is normal.   Nursing note and vitals reviewed.      ED Course        Procedures    Results for orders placed or performed during the hospital encounter of 02/21/19   Rapid strep screen   Result Value Ref Range    Specimen Description Throat     Rapid Strep A Screen       NEGATIVE: No Group A streptococcal antigen detected by immunoassay, await culture report.   Beta strep group A culture   Result Value Ref Range    Specimen Description Throat      Culture Micro Culture negative monitoring continues        Assessments & Plan (with Medical Decision Making)     I have reviewed the nursing notes.    I have reviewed the findings, diagnosis, plan and need for follow up with the patient.  ASSESSMENT / PLAN:  (J06.9) Upper respiratory tract infection, unspecified type  Comment: lungs CTA, appears well, non-toxic  Plan:  HPI, exam, and symptoms are consistent with viral URI, no antibiotic is indicated at this time.     Symptomatic treatments such as those listed below are recommended as indicated:  Take tylenol as directed on packaging - as needed  Humidified air  May try safely elevating HOB/crib or sleeping in recliner  Stay well hydrated, rest, wash hands often  Return to ED/UC if symptoms worsen or concerns develop  Patient verbally educated and given written discharge instructions         Medication List      There are no discharge medications for this visit.         Final diagnoses:   Upper respiratory tract infection, unspecified type       2/21/2019   [facility]     Maritza Lemus NP  02/22/19 [id number]

## 2019-02-21 NOTE — ED AVS SNAPSHOT
[facility]  [address] [place] [id number]  Phone:  [phone]                                    Zara Ibarra   MRN: [id number]    Department:  [facility]   Date of Visit:  2/21/2019           After Visit Summary Signature Page    I have received my discharge instructions, and my questions have been answered. I have discussed any challenges I see with this plan with the nurse or doctor.    ..........................................................................................................................................  Patient/Patient Representative Signature      ..........................................................................................................................................  Patient Representative Print Name and Relationship to Patient    ..................................................               ................................................  Date                                   Time    ..........................................................................................................................................  Reviewed by Signature/Title    ...................................................              ..............................................  Date                                               Time          22EPIC Rev 08/18

## 2019-02-22 ASSESSMENT — ENCOUNTER SYMPTOMS: HEADACHES: 1

## 2019-02-23 LAB
BACTERIA SPEC CULT: NORMAL
SPECIMEN SOURCE: NORMAL

## 2019-02-28 ENCOUNTER — HOSPITAL ENCOUNTER (OUTPATIENT)
Facility: HOSPITAL | Age: 19
Discharge: HOME | End: 2019-02-28
Attending: FAMILY MEDICINE | Admitting: FAMILY MEDICINE
Payer: COMMERCIAL

## 2019-02-28 VITALS
RESPIRATION RATE: 16 BRPM | SYSTOLIC BLOOD PRESSURE: 135 MMHG | HEIGHT: 71 IN | OXYGEN SATURATION: 97 % | HEART RATE: 98 BPM | BODY MASS INDEX: 38.92 KG/M2 | TEMPERATURE: 98 F | WEIGHT: 278 LBS | DIASTOLIC BLOOD PRESSURE: 85 MMHG

## 2019-02-28 LAB
ALBUMIN UR-MCNC: 10 MG/DL
AMPHETAMINES UR QL SCN: NEGATIVE
APPEARANCE UR: CLEAR
BACTERIA #/AREA URNS HPF: ABNORMAL /HPF
BARBITURATES UR QL: NEGATIVE
BENZODIAZ UR QL: NEGATIVE
BILIRUB UR QL STRIP: NEGATIVE
CANNABINOIDS UR QL SCN: NEGATIVE
COCAINE UR QL: NEGATIVE
COLOR UR AUTO: ABNORMAL
GLUCOSE UR STRIP-MCNC: NEGATIVE MG/DL
HGB UR QL STRIP: ABNORMAL
KETONES UR STRIP-MCNC: NEGATIVE MG/DL
LEUKOCYTE ESTERASE UR QL STRIP: NEGATIVE
METHADONE UR QL SCN: NEGATIVE
MUCOUS THREADS #/AREA URNS LPF: PRESENT /LPF
NITRATE UR QL: NEGATIVE
OPIATES UR QL SCN: NEGATIVE
PCP UR QL SCN: NEGATIVE
PH UR STRIP: 6.5 PH (ref 4.7–8)
RBC #/AREA URNS AUTO: 1 /HPF (ref 0–2)
SOURCE: ABNORMAL
SP GR UR STRIP: 1.03 (ref 1–1.03)
SQUAMOUS #/AREA URNS AUTO: 1 /HPF (ref 0–1)
UROBILINOGEN UR STRIP-MCNC: NORMAL MG/DL (ref 0–2)
WBC #/AREA URNS AUTO: 1 /HPF (ref 0–5)

## 2019-02-28 PROCEDURE — 80307 DRUG TEST PRSMV CHEM ANLYZR: CPT | Performed by: FAMILY MEDICINE

## 2019-02-28 PROCEDURE — G0463 HOSPITAL OUTPT CLINIC VISIT: HCPCS

## 2019-02-28 PROCEDURE — 81001 URINALYSIS AUTO W/SCOPE: CPT | Performed by: FAMILY MEDICINE

## 2019-02-28 RX ORDER — PRENATAL VIT/IRON FUM/FOLIC AC 27MG-0.8MG
1 TABLET ORAL DAILY
COMMUNITY
End: 2020-07-28

## 2019-02-28 ASSESSMENT — MIFFLIN-ST. JEOR: SCORE: 2127.13

## 2019-03-01 ENCOUNTER — HOSPITAL ENCOUNTER (INPATIENT)
Facility: HOSPITAL | Age: 19
LOS: 4 days | Discharge: HOME | End: 2019-03-05
Attending: FAMILY MEDICINE | Admitting: FAMILY MEDICINE
Payer: COMMERCIAL

## 2019-03-01 ENCOUNTER — ANESTHESIA EVENT (OUTPATIENT)
Dept: OBGYN | Facility: HOSPITAL | Age: 19
End: 2019-03-01
Payer: COMMERCIAL

## 2019-03-01 ENCOUNTER — ANESTHESIA (OUTPATIENT)
Dept: OBGYN | Facility: HOSPITAL | Age: 19
End: 2019-03-01
Payer: COMMERCIAL

## 2019-03-01 PROBLEM — Z37.9 NORMAL LABOR: Status: ACTIVE | Noted: 2019-03-01

## 2019-03-01 PROBLEM — Z36.89 ENCOUNTER FOR TRIAGE IN PREGNANT PATIENT: Status: RESOLVED | Noted: 2018-12-03 | Resolved: 2019-03-01

## 2019-03-01 LAB
ABO + RH BLD: NORMAL
ABO + RH BLD: NORMAL
BASOPHILS # BLD AUTO: 0 10E9/L (ref 0–0.2)
BASOPHILS NFR BLD AUTO: 0.3 %
BLD GP AB SCN SERPL QL: NORMAL
BLOOD BANK CMNT PATIENT-IMP: NORMAL
DIFFERENTIAL METHOD BLD: ABNORMAL
EOSINOPHIL # BLD AUTO: 0.1 10E9/L (ref 0–0.7)
EOSINOPHIL NFR BLD AUTO: 0.4 %
ERYTHROCYTE [DISTWIDTH] IN BLOOD BY AUTOMATED COUNT: 15.4 % (ref 10–15)
HCT VFR BLD AUTO: 39.2 % (ref 35–47)
HGB BLD-MCNC: 12.9 G/DL (ref 11.7–15.7)
IMM GRANULOCYTES # BLD: 0.1 10E9/L (ref 0–0.4)
IMM GRANULOCYTES NFR BLD: 0.8 %
LYMPHOCYTES # BLD AUTO: 2.1 10E9/L (ref 0.8–5.3)
LYMPHOCYTES NFR BLD AUTO: 15.7 %
MCH RBC QN AUTO: 27 PG (ref 26.5–33)
MCHC RBC AUTO-ENTMCNC: 32.9 G/DL (ref 31.5–36.5)
MCV RBC AUTO: 82 FL (ref 78–100)
MONOCYTES # BLD AUTO: 0.6 10E9/L (ref 0–1.3)
MONOCYTES NFR BLD AUTO: 4.3 %
NEUTROPHILS # BLD AUTO: 10.5 10E9/L (ref 1.6–8.3)
NEUTROPHILS NFR BLD AUTO: 78.5 %
NRBC # BLD AUTO: 0 10*3/UL
NRBC BLD AUTO-RTO: 0 /100
PLATELET # BLD AUTO: 307 10E9/L (ref 150–450)
RBC # BLD AUTO: 4.77 10E12/L (ref 3.8–5.2)
SPECIMEN EXP DATE BLD: NORMAL
WBC # BLD AUTO: 13.3 10E9/L (ref 4–11)

## 2019-03-01 PROCEDURE — 86850 RBC ANTIBODY SCREEN: CPT | Performed by: FAMILY MEDICINE

## 2019-03-01 PROCEDURE — 10907ZC DRAINAGE OF AMNIOTIC FLUID, THERAPEUTIC FROM PRODUCTS OF CONCEPTION, VIA NATURAL OR ARTIFICIAL OPENING: ICD-10-PCS | Performed by: OBSTETRICS & GYNECOLOGY

## 2019-03-01 PROCEDURE — 85025 COMPLETE CBC W/AUTO DIFF WBC: CPT | Performed by: FAMILY MEDICINE

## 2019-03-01 PROCEDURE — 25000125 ZZHC RX 250: Performed by: NURSE ANESTHETIST, CERTIFIED REGISTERED

## 2019-03-01 PROCEDURE — 12000000 ZZH R&B MED SURG/OB

## 2019-03-01 PROCEDURE — 37000011 ZZH ANESTHESIA WARD SERVICE: Performed by: NURSE ANESTHETIST, CERTIFIED REGISTERED

## 2019-03-01 PROCEDURE — 25800030 ZZH RX IP 258 OP 636: Performed by: FAMILY MEDICINE

## 2019-03-01 PROCEDURE — 36415 COLL VENOUS BLD VENIPUNCTURE: CPT | Performed by: FAMILY MEDICINE

## 2019-03-01 PROCEDURE — 86901 BLOOD TYPING SEROLOGIC RH(D): CPT | Performed by: FAMILY MEDICINE

## 2019-03-01 PROCEDURE — 86900 BLOOD TYPING SEROLOGIC ABO: CPT | Performed by: FAMILY MEDICINE

## 2019-03-01 RX ORDER — OXYTOCIN/0.9 % SODIUM CHLORIDE 30/500 ML
1-24 PLASTIC BAG, INJECTION (ML) INTRAVENOUS CONTINUOUS
Status: DISCONTINUED | OUTPATIENT
Start: 2019-03-02 | End: 2019-03-02

## 2019-03-01 RX ORDER — LIDOCAINE HYDROCHLORIDE AND EPINEPHRINE 15; 5 MG/ML; UG/ML
INJECTION, SOLUTION EPIDURAL PRN
Status: DISCONTINUED | OUTPATIENT
Start: 2019-03-01 | End: 2019-03-02

## 2019-03-01 RX ORDER — ONDANSETRON 2 MG/ML
4 INJECTION INTRAMUSCULAR; INTRAVENOUS EVERY 6 HOURS PRN
Status: DISCONTINUED | OUTPATIENT
Start: 2019-03-01 | End: 2019-03-01

## 2019-03-01 RX ORDER — LIDOCAINE HYDROCHLORIDE AND EPINEPHRINE 15; 5 MG/ML; UG/ML
3 INJECTION, SOLUTION EPIDURAL
Status: DISCONTINUED | OUTPATIENT
Start: 2019-03-01 | End: 2019-03-02

## 2019-03-01 RX ORDER — EPHEDRINE SULFATE 50 MG/ML
5 INJECTION, SOLUTION INTRAMUSCULAR; INTRAVENOUS; SUBCUTANEOUS
Status: DISCONTINUED | OUTPATIENT
Start: 2019-03-01 | End: 2019-03-02

## 2019-03-01 RX ORDER — ACETAMINOPHEN 325 MG/1
650 TABLET ORAL EVERY 4 HOURS PRN
Status: DISCONTINUED | OUTPATIENT
Start: 2019-03-01 | End: 2019-03-02

## 2019-03-01 RX ORDER — ONDANSETRON 2 MG/ML
4 INJECTION INTRAMUSCULAR; INTRAVENOUS EVERY 6 HOURS PRN
Status: DISCONTINUED | OUTPATIENT
Start: 2019-03-01 | End: 2019-03-02

## 2019-03-01 RX ORDER — IBUPROFEN 800 MG/1
800 TABLET, FILM COATED ORAL
Status: DISCONTINUED | OUTPATIENT
Start: 2019-03-01 | End: 2019-03-02

## 2019-03-01 RX ORDER — NALOXONE HYDROCHLORIDE 0.4 MG/ML
.1-.4 INJECTION, SOLUTION INTRAMUSCULAR; INTRAVENOUS; SUBCUTANEOUS
Status: DISCONTINUED | OUTPATIENT
Start: 2019-03-01 | End: 2019-03-01

## 2019-03-01 RX ORDER — LIDOCAINE 40 MG/G
CREAM TOPICAL
Status: DISCONTINUED | OUTPATIENT
Start: 2019-03-01 | End: 2019-03-02

## 2019-03-01 RX ORDER — OXYTOCIN 10 [USP'U]/ML
10 INJECTION, SOLUTION INTRAMUSCULAR; INTRAVENOUS ONCE
Status: DISCONTINUED | OUTPATIENT
Start: 2019-03-01 | End: 2019-03-02

## 2019-03-01 RX ORDER — OXYCODONE AND ACETAMINOPHEN 5; 325 MG/1; MG/1
1 TABLET ORAL
Status: DISCONTINUED | OUTPATIENT
Start: 2019-03-01 | End: 2019-03-02

## 2019-03-01 RX ORDER — NALBUPHINE HYDROCHLORIDE 20 MG/ML
2.5-5 INJECTION, SOLUTION INTRAMUSCULAR; INTRAVENOUS; SUBCUTANEOUS EVERY 6 HOURS PRN
Status: DISCONTINUED | OUTPATIENT
Start: 2019-03-01 | End: 2019-03-02

## 2019-03-01 RX ORDER — FENTANYL CITRATE 50 UG/ML
50-100 INJECTION, SOLUTION INTRAMUSCULAR; INTRAVENOUS
Status: DISCONTINUED | OUTPATIENT
Start: 2019-03-01 | End: 2019-03-02

## 2019-03-01 RX ORDER — EPHEDRINE SULFATE 50 MG/ML
INJECTION, SOLUTION INTRAMUSCULAR; INTRAVENOUS; SUBCUTANEOUS
Status: DISCONTINUED
Start: 2019-03-01 | End: 2019-03-02 | Stop reason: HOSPADM

## 2019-03-01 RX ORDER — NALOXONE HYDROCHLORIDE 0.4 MG/ML
.1-.4 INJECTION, SOLUTION INTRAMUSCULAR; INTRAVENOUS; SUBCUTANEOUS
Status: DISCONTINUED | OUTPATIENT
Start: 2019-03-01 | End: 2019-03-02

## 2019-03-01 RX ORDER — OXYTOCIN/0.9 % SODIUM CHLORIDE 30/500 ML
PLASTIC BAG, INJECTION (ML) INTRAVENOUS
Status: DISCONTINUED
Start: 2019-03-01 | End: 2019-03-02 | Stop reason: HOSPADM

## 2019-03-01 RX ORDER — VITAMIN A ACETATE, .BETA.-CAROTENE, ASCORBIC ACID, CHOLECALCIFEROL, .ALPHA.-TOCOPHEROL ACETATE, DL-, THIAMINE MONONITRATE, RIBOFLAVIN, NIACINAMIDE, PYRIDOXINE HYDROCHLORIDE, FOLIC ACID, CYANOCOBALAMIN, CALCIUM CARBONATE, FERROUS FUMARATE, ZINC OXIDE, AND CUPRIC OXIDE 2000; 2000; 120; 400; 22; 1.84; 3; 20; 10; 1; 12; 200; 27; 25; 2 [IU]/1; [IU]/1; MG/1; [IU]/1; MG/1; MG/1; MG/1; MG/1; MG/1; MG/1; UG/1; MG/1; MG/1; MG/1; MG/1
1 TABLET ORAL DAILY
Status: DISCONTINUED | OUTPATIENT
Start: 2019-03-01 | End: 2019-03-02

## 2019-03-01 RX ADMIN — Medication 3 ML: at 23:01

## 2019-03-01 RX ADMIN — Medication 10 ML: at 23:05

## 2019-03-01 RX ADMIN — SODIUM CHLORIDE, POTASSIUM CHLORIDE, SODIUM LACTATE AND CALCIUM CHLORIDE 500 ML: 600; 310; 30; 20 INJECTION, SOLUTION INTRAVENOUS at 22:13

## 2019-03-01 ASSESSMENT — MIFFLIN-ST. JEOR: SCORE: 2127.13

## 2019-03-01 NOTE — DISCHARGE INSTRUCTIONS

## 2019-03-01 NOTE — PLAN OF CARE
Labor Shift Note  Data: See Flowsheets activity for contraction and fetal documentation.   Vitals:    19 1140 19 1241 19 1440 19 1545   BP: 155/84 159/88 152/82 171/83   Pulse:       Resp:    18   Temp:  98.4  F (36.9  C) 98.2  F (36.8  C) 97.9  F (36.6  C)   TempSrc:  Oral Oral Oral   SpO2:  97%  99%   Weight:       Height:       . Signs and symptoms of infection Absent.    Complications in labor: Absent. Support person Miguel present.  Interventions: Continue uterine/fetal assessment per orders and nursing discretion. Vital Signs per order set. Comfort measures/pain control/labor management: Ambulation, hydration, position changes, birthing ball/sling and tub options to facilitate labor.  Anticipate   Plan: Anticipate . Provide labor/coping assistance as needed by patient and support person.  Observe for and notify care provider of indications of progressing labor, need for pain medications, or signs of fetal/maternal compromise.

## 2019-03-01 NOTE — H&P
No significant change in general health status based on examination of the patient, review of Nursing Admission Database and prenatal record. Rh+, GBS-. Uncomplicated prenatal course. Was smoking but quit. Obese. SROM at about 7 this am after cramping all night. Contractions now stronger. Handling well.    EFW: 8lb

## 2019-03-01 NOTE — PROGRESS NOTES
Contractions have spaced a little, but making progress. Is about 7 cm, 0 station. Working well with contractions.

## 2019-03-01 NOTE — PLAN OF CARE
"Zara Ibarra is a 18 year old  and 39w5d patient came in complaining of Contractions    Patient Active Problem List   Diagnosis     Suicide attempt (H)     Environmental allergies     Suicidal ideation     Dysmenorrhea     Sleep disorder breathing     Suicidal ideations     Vitamin D deficiency     Laceration of left wrist, subsequent encounter     Pain of left sacroiliac joint     Encounter for triage in pregnant patient       Pt discharged to home at 11:50 PM and encouraged to rest and drink plenty of fluids.  Pt told to call/return if bleeding more than spotting, water breaks, contractions 3-5 minutes apart that she has to breath through.     Nursing education on early labor symptoms provided. Self-management instructions reviewed.  AVS given and signed. All questions answered and patient verbalizes understanding.    /85   Pulse 98   Temp 98  F (36.7  C) (Oral)   Resp 16   Ht 1.803 m (5' 11\")   Wt 126.1 kg (278 lb)   SpO2 97%   BMI 38.77 kg/m      Cervical status: dilated to 1  Fetal Assessment: Reactive    Discharge support:  Family/Friend Support    Obstetric History       T0      L0     SAB0   TAB0   Ectopic0   Multiple0   Live Births0       # Outcome Date GA Lbr Presley/2nd Weight Sex Delivery Anes PTL Lv   1 Current                   Viviana Maxwell    "

## 2019-03-01 NOTE — PLAN OF CARE
Labor Admission  Zara Ibarra  MRN: [id number]  Gestational Age: 39w6d      Zara Ibarra is admitted for active labor.  States melina since 2300.  Rates pain at 8/10.  spotting  began at 5:00.  Reports LOF.  Reports moderate clear fluid seen.    Dr. Bazzi  notified of arrival and condition and Intrapartum orders initiated.      FHT: 125  NST: not ordered   Uterine Assessment: frequency q 5 minutes, Contractions: Strength of mild quality.     Patient is alert and oriented X 3,Patient oriented to room, unit, hourly rounding, and plan of care.  Call light within reach. Explained admission packet with patient bill of rights brochure. Will continue to monitor and document as needed.     Inpatient nursing criteria listed below was met:    Health care directives status obtained and documented: Yes  Patient identifies a surrogate decision maker: Yes   If yes, who:Susan mohna Contact Information: [phone]  Core Measure diagnosis present:: No  Vaccine assessment done and vaccines ordered if appropriate. Yes, none ordered at this time  Clergy visit ordered if patient requests: Yes, pt declines  Skin issues/needs documented:N/A, no skin issues   Isolation needs addressed, if appropriate: N/A  Fall Prevention (Med and High risk): Care plan updated, Education given and documented and signage used: Yes, no risk factors at this time  Care Plan initiated: Yes  Education Documented (Reminder to educate patient if MRSA is present on admission): Yes  Education Assessment documented:Yes  Patient has discharge needs (If yes, please explain): No

## 2019-03-01 NOTE — PLAN OF CARE
OB Triage Note  Zara Ibarra  MRN: [id number]  Gestational Age: 39w5d      Zara Ibarra presents for contractions (sign/symptom/concern).      States melina since .  Rates pain at 8/10.  Denies bleeding, denies loss of fluid. Notes baby moving normally.    Dr. Bazzi notified of arrival and condition.  Oriented patient to surroundings. Call light within reach.     Plan:  -Fetal/uterine monitoring per MD/patient plan.  -Sterile vaginal exam.  -Nursing education on early labor symptoms provided.  -Recheck cervix in 1 hour.

## 2019-03-02 ENCOUNTER — TRANSFERRED RECORDS (OUTPATIENT)
Dept: HEALTH INFORMATION MANAGEMENT | Facility: HOSPITAL | Age: 19
End: 2019-03-02

## 2019-03-02 ENCOUNTER — ANESTHESIA (OUTPATIENT)
Dept: SURGERY | Facility: HOSPITAL | Age: 19
End: 2019-03-02
Payer: COMMERCIAL

## 2019-03-02 ENCOUNTER — ANESTHESIA EVENT (OUTPATIENT)
Dept: SURGERY | Facility: HOSPITAL | Age: 19
End: 2019-03-02
Payer: COMMERCIAL

## 2019-03-02 LAB
BASOPHILS # BLD AUTO: 0 10E9/L (ref 0–0.2)
BASOPHILS NFR BLD AUTO: 0.1 %
DIFFERENTIAL METHOD BLD: ABNORMAL
EOSINOPHIL # BLD AUTO: 0 10E9/L (ref 0–0.7)
EOSINOPHIL NFR BLD AUTO: 0 %
ERYTHROCYTE [DISTWIDTH] IN BLOOD BY AUTOMATED COUNT: 15.1 % (ref 10–15)
HCT VFR BLD AUTO: 34.1 % (ref 35–47)
HGB BLD-MCNC: 11.6 G/DL (ref 11.7–15.7)
IMM GRANULOCYTES # BLD: 0.1 10E9/L (ref 0–0.4)
IMM GRANULOCYTES NFR BLD: 0.5 %
LYMPHOCYTES # BLD AUTO: 1.5 10E9/L (ref 0.8–5.3)
LYMPHOCYTES NFR BLD AUTO: 6.7 %
MCH RBC QN AUTO: 27.5 PG (ref 26.5–33)
MCHC RBC AUTO-ENTMCNC: 34 G/DL (ref 31.5–36.5)
MCV RBC AUTO: 81 FL (ref 78–100)
MONOCYTES # BLD AUTO: 1.3 10E9/L (ref 0–1.3)
MONOCYTES NFR BLD AUTO: 5.8 %
NEUTROPHILS # BLD AUTO: 19.5 10E9/L (ref 1.6–8.3)
NEUTROPHILS NFR BLD AUTO: 86.9 %
NRBC # BLD AUTO: 0 10*3/UL
NRBC BLD AUTO-RTO: 0 /100
PLATELET # BLD AUTO: 276 10E9/L (ref 150–450)
RBC # BLD AUTO: 4.22 10E12/L (ref 3.8–5.2)
WBC # BLD AUTO: 22.5 10E9/L (ref 4–11)

## 2019-03-02 PROCEDURE — 36000056 ZZH SURGERY LEVEL 3 1ST 30 MIN: Performed by: OBSTETRICS & GYNECOLOGY

## 2019-03-02 PROCEDURE — 88307 TISSUE EXAM BY PATHOLOGIST: CPT | Mod: TC | Performed by: OBSTETRICS & GYNECOLOGY

## 2019-03-02 PROCEDURE — 27110028 ZZH OR GENERAL SUPPLY NON-STERILE: Performed by: OBSTETRICS & GYNECOLOGY

## 2019-03-02 PROCEDURE — 40000275 ZZH STATISTIC RCP TIME EA 10 MIN

## 2019-03-02 PROCEDURE — 25000125 ZZHC RX 250: Performed by: FAMILY MEDICINE

## 2019-03-02 PROCEDURE — 25800030 ZZH RX IP 258 OP 636: Performed by: OBSTETRICS & GYNECOLOGY

## 2019-03-02 PROCEDURE — 12000000 ZZH R&B MED SURG/OB

## 2019-03-02 PROCEDURE — 25000132 ZZH RX MED GY IP 250 OP 250 PS 637: Performed by: OBSTETRICS & GYNECOLOGY

## 2019-03-02 PROCEDURE — 25000125 ZZHC RX 250: Performed by: NURSE ANESTHETIST, CERTIFIED REGISTERED

## 2019-03-02 PROCEDURE — 25000132 ZZH RX MED GY IP 250 OP 250 PS 637: Performed by: FAMILY MEDICINE

## 2019-03-02 PROCEDURE — 71000015 ZZH RECOVERY PHASE 1 LEVEL 2 EA ADDTL HR: Performed by: OBSTETRICS & GYNECOLOGY

## 2019-03-02 PROCEDURE — 71000014 ZZH RECOVERY PHASE 1 LEVEL 2 FIRST HR: Performed by: OBSTETRICS & GYNECOLOGY

## 2019-03-02 PROCEDURE — 37000009 ZZH ANESTHESIA TECHNICAL FEE, EACH ADDTL 15 MIN: Performed by: OBSTETRICS & GYNECOLOGY

## 2019-03-02 PROCEDURE — 25000125 ZZHC RX 250: Performed by: OBSTETRICS & GYNECOLOGY

## 2019-03-02 PROCEDURE — 59514 CESAREAN DELIVERY ONLY: CPT | Performed by: OBSTETRICS & GYNECOLOGY

## 2019-03-02 PROCEDURE — 25000128 H RX IP 250 OP 636: Performed by: OBSTETRICS & GYNECOLOGY

## 2019-03-02 PROCEDURE — 36000058 ZZH SURGERY LEVEL 3 EA 15 ADDTL MIN: Performed by: OBSTETRICS & GYNECOLOGY

## 2019-03-02 PROCEDURE — 01999 UNLISTED ANES PROCEDURE: CPT | Performed by: NURSE ANESTHETIST, CERTIFIED REGISTERED

## 2019-03-02 PROCEDURE — 36415 COLL VENOUS BLD VENIPUNCTURE: CPT | Performed by: OBSTETRICS & GYNECOLOGY

## 2019-03-02 PROCEDURE — 85025 COMPLETE CBC W/AUTO DIFF WBC: CPT | Performed by: OBSTETRICS & GYNECOLOGY

## 2019-03-02 PROCEDURE — 25000128 H RX IP 250 OP 636: Performed by: NURSE ANESTHETIST, CERTIFIED REGISTERED

## 2019-03-02 PROCEDURE — 27210794 ZZH OR GENERAL SUPPLY STERILE: Performed by: OBSTETRICS & GYNECOLOGY

## 2019-03-02 PROCEDURE — 37000008 ZZH ANESTHESIA TECHNICAL FEE, 1ST 30 MIN: Performed by: OBSTETRICS & GYNECOLOGY

## 2019-03-02 RX ORDER — ONDANSETRON 4 MG/1
4 TABLET, ORALLY DISINTEGRATING ORAL EVERY 30 MIN PRN
Status: DISCONTINUED | OUTPATIENT
Start: 2019-03-02 | End: 2019-03-02 | Stop reason: HOSPADM

## 2019-03-02 RX ORDER — CEFOXITIN SODIUM 2 G/50ML
2 INJECTION, SOLUTION INTRAVENOUS EVERY 6 HOURS
Status: DISCONTINUED | OUTPATIENT
Start: 2019-03-02 | End: 2019-03-04

## 2019-03-02 RX ORDER — AMOXICILLIN 250 MG
2 CAPSULE ORAL 2 TIMES DAILY PRN
Status: DISCONTINUED | OUTPATIENT
Start: 2019-03-02 | End: 2019-03-04

## 2019-03-02 RX ORDER — BISACODYL 10 MG
10 SUPPOSITORY, RECTAL RECTAL DAILY PRN
Status: DISCONTINUED | OUTPATIENT
Start: 2019-03-04 | End: 2019-03-05 | Stop reason: HOSPADM

## 2019-03-02 RX ORDER — OXYTOCIN 10 [USP'U]/ML
10 INJECTION, SOLUTION INTRAMUSCULAR; INTRAVENOUS
Status: DISCONTINUED | OUTPATIENT
Start: 2019-03-02 | End: 2019-03-05 | Stop reason: HOSPADM

## 2019-03-02 RX ORDER — DEXAMETHASONE SODIUM PHOSPHATE 4 MG/ML
4 INJECTION, SOLUTION INTRA-ARTICULAR; INTRALESIONAL; INTRAMUSCULAR; INTRAVENOUS; SOFT TISSUE
Status: DISCONTINUED | OUTPATIENT
Start: 2019-03-02 | End: 2019-03-02 | Stop reason: HOSPADM

## 2019-03-02 RX ORDER — DEXTROSE, SODIUM CHLORIDE, SODIUM LACTATE, POTASSIUM CHLORIDE, AND CALCIUM CHLORIDE 5; .6; .31; .03; .02 G/100ML; G/100ML; G/100ML; G/100ML; G/100ML
INJECTION, SOLUTION INTRAVENOUS CONTINUOUS
Status: DISCONTINUED | OUTPATIENT
Start: 2019-03-02 | End: 2019-03-04

## 2019-03-02 RX ORDER — HYDROCORTISONE 2.5 %
CREAM (GRAM) TOPICAL 3 TIMES DAILY PRN
Status: DISCONTINUED | OUTPATIENT
Start: 2019-03-02 | End: 2019-03-05 | Stop reason: HOSPADM

## 2019-03-02 RX ORDER — AMOXICILLIN 250 MG
1 CAPSULE ORAL 2 TIMES DAILY PRN
Status: DISCONTINUED | OUTPATIENT
Start: 2019-03-02 | End: 2019-03-04

## 2019-03-02 RX ORDER — LABETALOL HYDROCHLORIDE 5 MG/ML
10 INJECTION, SOLUTION INTRAVENOUS
Status: DISCONTINUED | OUTPATIENT
Start: 2019-03-02 | End: 2019-03-02 | Stop reason: HOSPADM

## 2019-03-02 RX ORDER — DEXAMETHASONE SODIUM PHOSPHATE 10 MG/ML
INJECTION, SOLUTION INTRAMUSCULAR; INTRAVENOUS PRN
Status: DISCONTINUED | OUTPATIENT
Start: 2019-03-02 | End: 2019-03-02

## 2019-03-02 RX ORDER — SODIUM CHLORIDE, SODIUM LACTATE, POTASSIUM CHLORIDE, CALCIUM CHLORIDE 600; 310; 30; 20 MG/100ML; MG/100ML; MG/100ML; MG/100ML
INJECTION, SOLUTION INTRAVENOUS CONTINUOUS
Status: DISCONTINUED | OUTPATIENT
Start: 2019-03-02 | End: 2019-03-02

## 2019-03-02 RX ORDER — SODIUM CHLORIDE 9 MG/ML
INJECTION, SOLUTION INTRAVENOUS
Status: DISCONTINUED
Start: 2019-03-02 | End: 2019-03-02 | Stop reason: HOSPADM

## 2019-03-02 RX ORDER — IBUPROFEN 800 MG/1
800 TABLET, FILM COATED ORAL EVERY 6 HOURS PRN
Status: DISCONTINUED | OUTPATIENT
Start: 2019-03-02 | End: 2019-03-05 | Stop reason: HOSPADM

## 2019-03-02 RX ORDER — CARBOPROST TROMETHAMINE 250 UG/ML
250 INJECTION, SOLUTION INTRAMUSCULAR
Status: DISCONTINUED | OUTPATIENT
Start: 2019-03-02 | End: 2019-03-05 | Stop reason: HOSPADM

## 2019-03-02 RX ORDER — ROPIVACAINE HYDROCHLORIDE 5 MG/ML
INJECTION, SOLUTION EPIDURAL; INFILTRATION; PERINEURAL PRN
Status: DISCONTINUED | OUTPATIENT
Start: 2019-03-02 | End: 2019-03-02

## 2019-03-02 RX ORDER — KETOROLAC TROMETHAMINE 30 MG/ML
30 INJECTION, SOLUTION INTRAMUSCULAR; INTRAVENOUS EVERY 6 HOURS
Status: COMPLETED | OUTPATIENT
Start: 2019-03-02 | End: 2019-03-02

## 2019-03-02 RX ORDER — CITRIC ACID/SODIUM CITRATE 334-500MG
SOLUTION, ORAL ORAL
Status: DISCONTINUED
Start: 2019-03-02 | End: 2019-03-02 | Stop reason: HOSPADM

## 2019-03-02 RX ORDER — OXYTOCIN/0.9 % SODIUM CHLORIDE 30/500 ML
340 PLASTIC BAG, INJECTION (ML) INTRAVENOUS CONTINUOUS PRN
Status: DISCONTINUED | OUTPATIENT
Start: 2019-03-02 | End: 2019-03-05 | Stop reason: HOSPADM

## 2019-03-02 RX ORDER — HYDROMORPHONE HYDROCHLORIDE 1 MG/ML
.3-.5 INJECTION, SOLUTION INTRAMUSCULAR; INTRAVENOUS; SUBCUTANEOUS EVERY 30 MIN PRN
Status: DISCONTINUED | OUTPATIENT
Start: 2019-03-02 | End: 2019-03-05 | Stop reason: HOSPADM

## 2019-03-02 RX ORDER — ONDANSETRON 2 MG/ML
4 INJECTION INTRAMUSCULAR; INTRAVENOUS EVERY 6 HOURS PRN
Status: DISCONTINUED | OUTPATIENT
Start: 2019-03-02 | End: 2019-03-05 | Stop reason: HOSPADM

## 2019-03-02 RX ORDER — NALOXONE HYDROCHLORIDE 0.4 MG/ML
.1-.4 INJECTION, SOLUTION INTRAMUSCULAR; INTRAVENOUS; SUBCUTANEOUS
Status: CANCELLED | OUTPATIENT
Start: 2019-03-02 | End: 2019-03-03

## 2019-03-02 RX ORDER — OXYCODONE AND ACETAMINOPHEN 5; 325 MG/1; MG/1
1-2 TABLET ORAL EVERY 4 HOURS PRN
Status: DISCONTINUED | OUTPATIENT
Start: 2019-03-02 | End: 2019-03-05 | Stop reason: HOSPADM

## 2019-03-02 RX ORDER — FENTANYL CITRATE 50 UG/ML
25-50 INJECTION, SOLUTION INTRAMUSCULAR; INTRAVENOUS
Status: DISCONTINUED | OUTPATIENT
Start: 2019-03-02 | End: 2019-03-02 | Stop reason: HOSPADM

## 2019-03-02 RX ORDER — CITRIC ACID/SODIUM CITRATE 334-500MG
30 SOLUTION, ORAL ORAL ONCE
Status: COMPLETED | OUTPATIENT
Start: 2019-03-02 | End: 2019-03-02

## 2019-03-02 RX ORDER — AZITHROMYCIN 500 MG/1
INJECTION, POWDER, LYOPHILIZED, FOR SOLUTION INTRAVENOUS
Status: DISCONTINUED
Start: 2019-03-02 | End: 2019-03-02 | Stop reason: HOSPADM

## 2019-03-02 RX ORDER — ONDANSETRON 2 MG/ML
4 INJECTION INTRAMUSCULAR; INTRAVENOUS EVERY 30 MIN PRN
Status: DISCONTINUED | OUTPATIENT
Start: 2019-03-02 | End: 2019-03-02 | Stop reason: HOSPADM

## 2019-03-02 RX ORDER — CITRIC ACID/SODIUM CITRATE 334-500MG
30 SOLUTION, ORAL ORAL
Status: DISCONTINUED | OUTPATIENT
Start: 2019-03-02 | End: 2019-03-02

## 2019-03-02 RX ORDER — SODIUM CHLORIDE, SODIUM LACTATE, POTASSIUM CHLORIDE, CALCIUM CHLORIDE 600; 310; 30; 20 MG/100ML; MG/100ML; MG/100ML; MG/100ML
INJECTION, SOLUTION INTRAVENOUS CONTINUOUS
Status: DISCONTINUED | OUTPATIENT
Start: 2019-03-02 | End: 2019-03-02 | Stop reason: HOSPADM

## 2019-03-02 RX ORDER — CARBOPROST TROMETHAMINE 250 UG/ML
INJECTION, SOLUTION INTRAMUSCULAR PRN
Status: DISCONTINUED | OUTPATIENT
Start: 2019-03-02 | End: 2019-03-02

## 2019-03-02 RX ORDER — DIPHENHYDRAMINE HYDROCHLORIDE 50 MG/ML
25 INJECTION INTRAMUSCULAR; INTRAVENOUS EVERY 6 HOURS PRN
Status: DISCONTINUED | OUTPATIENT
Start: 2019-03-02 | End: 2019-03-05 | Stop reason: HOSPADM

## 2019-03-02 RX ORDER — OXYTOCIN/0.9 % SODIUM CHLORIDE 30/500 ML
100 PLASTIC BAG, INJECTION (ML) INTRAVENOUS CONTINUOUS
Status: DISCONTINUED | OUTPATIENT
Start: 2019-03-02 | End: 2019-03-05 | Stop reason: HOSPADM

## 2019-03-02 RX ORDER — PROPOFOL 10 MG/ML
INJECTION, EMULSION INTRAVENOUS PRN
Status: DISCONTINUED | OUTPATIENT
Start: 2019-03-02 | End: 2019-03-02

## 2019-03-02 RX ORDER — NALOXONE HYDROCHLORIDE 0.4 MG/ML
.1-.4 INJECTION, SOLUTION INTRAMUSCULAR; INTRAVENOUS; SUBCUTANEOUS
Status: DISCONTINUED | OUTPATIENT
Start: 2019-03-02 | End: 2019-03-02

## 2019-03-02 RX ORDER — MEPERIDINE HYDROCHLORIDE 50 MG/ML
12.5 INJECTION INTRAMUSCULAR; INTRAVENOUS; SUBCUTANEOUS EVERY 5 MIN PRN
Status: DISCONTINUED | OUTPATIENT
Start: 2019-03-02 | End: 2019-03-02 | Stop reason: HOSPADM

## 2019-03-02 RX ORDER — HYDROMORPHONE HYDROCHLORIDE 1 MG/ML
.3-.5 INJECTION, SOLUTION INTRAMUSCULAR; INTRAVENOUS; SUBCUTANEOUS EVERY 5 MIN PRN
Status: DISCONTINUED | OUTPATIENT
Start: 2019-03-02 | End: 2019-03-02 | Stop reason: HOSPADM

## 2019-03-02 RX ORDER — SIMETHICONE 80 MG
80 TABLET,CHEWABLE ORAL 4 TIMES DAILY PRN
Status: DISCONTINUED | OUTPATIENT
Start: 2019-03-02 | End: 2019-03-05 | Stop reason: HOSPADM

## 2019-03-02 RX ORDER — CLINDAMYCIN PHOSPHATE 900 MG/50ML
900 INJECTION, SOLUTION INTRAVENOUS EVERY 8 HOURS
Status: DISCONTINUED | OUTPATIENT
Start: 2019-03-02 | End: 2019-03-04

## 2019-03-02 RX ORDER — ONDANSETRON 2 MG/ML
INJECTION INTRAMUSCULAR; INTRAVENOUS PRN
Status: DISCONTINUED | OUTPATIENT
Start: 2019-03-02 | End: 2019-03-02

## 2019-03-02 RX ORDER — DIPHENHYDRAMINE HCL 25 MG
25 CAPSULE ORAL EVERY 6 HOURS PRN
Status: DISCONTINUED | OUTPATIENT
Start: 2019-03-02 | End: 2019-03-05 | Stop reason: HOSPADM

## 2019-03-02 RX ORDER — CEFAZOLIN SODIUM 1 G/3ML
INJECTION, POWDER, FOR SOLUTION INTRAMUSCULAR; INTRAVENOUS
Status: DISCONTINUED
Start: 2019-03-02 | End: 2019-03-02 | Stop reason: HOSPADM

## 2019-03-02 RX ORDER — LIDOCAINE 40 MG/G
CREAM TOPICAL
Status: DISCONTINUED | OUTPATIENT
Start: 2019-03-02 | End: 2019-03-05 | Stop reason: HOSPADM

## 2019-03-02 RX ORDER — LANOLIN 100 %
OINTMENT (GRAM) TOPICAL
Status: DISCONTINUED | OUTPATIENT
Start: 2019-03-02 | End: 2019-03-05 | Stop reason: HOSPADM

## 2019-03-02 RX ORDER — METHYLERGONOVINE MALEATE 0.2 MG/ML
200 INJECTION INTRAVENOUS
Status: DISCONTINUED | OUTPATIENT
Start: 2019-03-02 | End: 2019-03-05 | Stop reason: HOSPADM

## 2019-03-02 RX ORDER — NALOXONE HYDROCHLORIDE 0.4 MG/ML
.1-.4 INJECTION, SOLUTION INTRAMUSCULAR; INTRAVENOUS; SUBCUTANEOUS
Status: DISCONTINUED | OUTPATIENT
Start: 2019-03-02 | End: 2019-03-05 | Stop reason: HOSPADM

## 2019-03-02 RX ADMIN — AZITHROMYCIN MONOHYDRATE 500 MG: 500 INJECTION, POWDER, LYOPHILIZED, FOR SOLUTION INTRAVENOUS at 01:29

## 2019-03-02 RX ADMIN — CARBOPROST TROMETHAMINE 250 MCG: 250 INJECTION, SOLUTION INTRAMUSCULAR at 01:52

## 2019-03-02 RX ADMIN — CARBOPROST TROMETHAMINE 250 MCG: 250 INJECTION, SOLUTION INTRAMUSCULAR at 01:40

## 2019-03-02 RX ADMIN — KETOROLAC TROMETHAMINE 30 MG: 30 INJECTION, SOLUTION INTRAMUSCULAR at 17:27

## 2019-03-02 RX ADMIN — CEFOXITIN SODIUM 2 G: 2 INJECTION, SOLUTION INTRAVENOUS at 23:44

## 2019-03-02 RX ADMIN — ONDANSETRON 4 MG: 2 INJECTION INTRAMUSCULAR; INTRAVENOUS at 02:13

## 2019-03-02 RX ADMIN — OXYCODONE HYDROCHLORIDE AND ACETAMINOPHEN 1 TABLET: 5; 325 TABLET ORAL at 16:11

## 2019-03-02 RX ADMIN — SODIUM CHLORIDE, POTASSIUM CHLORIDE, SODIUM LACTATE AND CALCIUM CHLORIDE: 600; 310; 30; 20 INJECTION, SOLUTION INTRAVENOUS at 01:00

## 2019-03-02 RX ADMIN — DEXAMETHASONE SODIUM PHOSPHATE 10 MG: 10 INJECTION, SOLUTION INTRAMUSCULAR; INTRAVENOUS at 02:10

## 2019-03-02 RX ADMIN — HYDROMORPHONE HYDROCHLORIDE 0.5 MG: 1 INJECTION, SOLUTION INTRAMUSCULAR; INTRAVENOUS; SUBCUTANEOUS at 07:45

## 2019-03-02 RX ADMIN — HYDROMORPHONE HYDROCHLORIDE 0.5 MG: 1 INJECTION, SOLUTION INTRAMUSCULAR; INTRAVENOUS; SUBCUTANEOUS at 12:01

## 2019-03-02 RX ADMIN — Medication 100 MG: at 01:29

## 2019-03-02 RX ADMIN — SODIUM CITRATE AND CITRIC ACID MONOHYDRATE 30 ML: 500; 334 SOLUTION ORAL at 00:47

## 2019-03-02 RX ADMIN — OXYTOCIN-SODIUM CHLORIDE 0.9% IV SOLN 30 UNIT/500ML 100 ML/HR: 30-0.9/5 SOLUTION at 05:38

## 2019-03-02 RX ADMIN — ROPIVACAINE HYDROCHLORIDE 15 ML: 5 INJECTION, SOLUTION EPIDURAL; INFILTRATION; PERINEURAL at 02:01

## 2019-03-02 RX ADMIN — ROPIVACAINE HYDROCHLORIDE 15 ML: 5 INJECTION, SOLUTION EPIDURAL; INFILTRATION; PERINEURAL at 02:06

## 2019-03-02 RX ADMIN — SODIUM CHLORIDE, SODIUM LACTATE, POTASSIUM CHLORIDE, CALCIUM CHLORIDE AND DEXTROSE MONOHYDRATE: 5; 600; 310; 30; 20 INJECTION, SOLUTION INTRAVENOUS at 11:00

## 2019-03-02 RX ADMIN — OXYTOCIN-SODIUM CHLORIDE 0.9% IV SOLN 30 UNIT/500ML 500 ML: 30-0.9/5 SOLUTION at 01:45

## 2019-03-02 RX ADMIN — HYDROMORPHONE HYDROCHLORIDE 0.5 MG: 1 INJECTION, SOLUTION INTRAMUSCULAR; INTRAVENOUS; SUBCUTANEOUS at 05:33

## 2019-03-02 RX ADMIN — CEFAZOLIN 3 G: 1 INJECTION, POWDER, FOR SOLUTION INTRAMUSCULAR; INTRAVENOUS at 01:30

## 2019-03-02 RX ADMIN — KETOROLAC TROMETHAMINE 30 MG: 30 INJECTION, SOLUTION INTRAMUSCULAR at 23:36

## 2019-03-02 RX ADMIN — OXYCODONE HYDROCHLORIDE AND ACETAMINOPHEN 2 TABLET: 5; 325 TABLET ORAL at 21:10

## 2019-03-02 RX ADMIN — OXYTOCIN-SODIUM CHLORIDE 0.9% IV SOLN 30 UNIT/500ML 2 MILLI-UNITS/MIN: 30-0.9/5 SOLUTION at 00:05

## 2019-03-02 RX ADMIN — CLINDAMYCIN IN 5 PERCENT DEXTROSE 900 MG: 18 INJECTION, SOLUTION INTRAVENOUS at 20:04

## 2019-03-02 RX ADMIN — OXYTOCIN-SODIUM CHLORIDE 0.9% IV SOLN 30 UNIT/500ML 500 ML: 30-0.9/5 SOLUTION at 01:30

## 2019-03-02 RX ADMIN — CEFOXITIN SODIUM 2 G: 2 INJECTION, SOLUTION INTRAVENOUS at 17:20

## 2019-03-02 RX ADMIN — PROPOFOL 200 MG: 10 INJECTION, EMULSION INTRAVENOUS at 01:29

## 2019-03-02 RX ADMIN — KETOROLAC TROMETHAMINE 30 MG: 30 INJECTION, SOLUTION INTRAMUSCULAR at 05:30

## 2019-03-02 RX ADMIN — KETOROLAC TROMETHAMINE 30 MG: 30 INJECTION, SOLUTION INTRAMUSCULAR at 12:01

## 2019-03-02 NOTE — PROGRESS NOTES
[facility]    Obstetrics Post-Op / Progress Note    Assessment & Plan   Assessment:  -Day of Surgery # 0  Procedure(s):   SECTION WITH VIABLE BABY BOY BORN @ 0129.    Doing well.  Clean wound without signs of infection.  No immediate surgical complications identified.  No excessive bleeding  Pain well-controlled.  Tolerating physical therapy and rehabilitation well.  Breast feeding    Plan:  Ambulation encouraged  Breast feeding strategies discussed  Monitor wound for signs of infection  Pain control measures as needed  Reportable signs and symptoms dicussed with the patient    Fortino Maguire     Interval History   Doing well.  Pain is controlled.  No fevers.  No history of wound drainage, warmth or significant erythema.  Good appetite.  Denies chest pain, shortness of breath, nausea or vomiting.  Breastfeeding well.    Medications     dextrose 5% lactated ringers       NO Rho (D) immune globulin (RhoGam) needed - mother Rh POSITIVE       oxytocin in 0.9% NaCl 100 mL/hr (19 0538)     oxytocin in 0.9% NaCl         ketorolac  30 mg Intravenous Q6H     [START ON 3/3/2019] measles, mumps and rubella vaccine  0.5 mL Subcutaneous Once     sodium chloride (PF)  3 mL Intracatheter Q8H     [START ON 3/3/2019] Tdap (tetanus-diphtheria-acell pertussis)  0.5 mL Intramuscular Once       Physical Exam   Temp: 99.7  F (37.6  C) Temp src: Oral BP: 127/70 Pulse: 98 Heart Rate: 109 Resp: 16 SpO2: 96 % O2 Device: None (Room air) Oxygen Delivery: 2 LPM  Vitals:    19 0739   Weight: 126.1 kg (278 lb)     Vital Signs with Ranges  Temp:  [97  F (36.1  C)-99.7  F (37.6  C)] 99.7  F (37.6  C)  Pulse:  [] 98  Heart Rate:  [] 109  Resp:  [10-31] 16  BP: (101-183)/() 127/70  SpO2:  [92 %-100 %] 96 %  I/O last 3 completed shifts:  In: 900 [I.V.:900]  Out: 3700 [Urine:2700; Blood:1000]    Uterine fundus is firm, non-tender and at the level of the umbilicus  Incision C/D/I  Extremities  Non-tender  Heart is regular rate and rhythm and lungs clear to auscultation  Reflexes 1+, equal, symmetric, without clonus  Urine output is good and clear    Data   Recent Labs   Lab Test 03/01/19 0908   ABO A   RH Pos   AS Neg     Recent Labs   Lab Test 03/01/19 0908 05/27/18  1028   HGB 12.9 13.5     No lab results found.

## 2019-03-02 NOTE — ANESTHESIA PROCEDURE NOTES
Peripheral nerve/Neuraxial procedure note : epidural catheter  Pre-Procedure  Performed by   Referred by DR. BYRNES  Location: OB    Procedure Times:3/1/2019 10:50 PM and 3/1/2019 11:14 PM  Pre-Anesthestic Checklist: patient identified, IV checked, site marked, risks and benefits discussed, informed consent, monitors and equipment checked, pre-op evaluation, at physician/surgeon's request and post-op pain management    Timeout  Correct Patient: Yes   Correct Procedure: Yes   Correct Site: Yes   Correct Laterality: N/A   Correct Position: Yes   Site Marked: Yes   .   Procedure Documentation    Diagnosis:pregnant.    Procedure:    Epidural catheter.  Insertion Site:L2-3  (midline approach) Injection technique: LORT saline   Local skin infiltrated with mL of 1% lidocaine.  SANDRA at 9 cm     Patient Prep;povidone-iodine 7.5% surgical scrub.  .  Needle: Touhy needle Needle Gauge: 18.    Needle Length (Inches) 3.5  # of attempts: 2 and  # of redirects:  2 .   Catheter: 20 G . .  Catheter threaded easily  .  .   .    Assessment/Narrative  Paresthesias: No.  .  .  Aspiration negative for heme or CSF  . Test dose of 3 mL at 23:01. No test dose negative for signs of intravascular, subdural or intrathecal injection. Sensory Level Left: T6  Sensory Level Right: T6

## 2019-03-02 NOTE — ANESTHESIA POSTPROCEDURE EVALUATION
Patient: Zara Ibarra    Procedure(s):   SECTION WITH VIABLE BABY BOY BORN @ 0129.    Diagnosis:* No pre-op diagnosis entered *  Diagnosis Additional Information: No value filed.    Anesthesia Type:  Spinal    Note:  Anesthesia Post Evaluation    Patient location during evaluation: ICU  Patient participation: Able to participate in evaluation but full recovery from regional anesthesia has not yet ocurrred but is anticipated to occur within 48 hours  Level of consciousness: awake and alert  Pain management: adequate  Airway patency: patent  Cardiovascular status: acceptable and stable  Respiratory status: acceptable and nasal cannula  Hydration status: acceptable  PONV: none             Last vitals:  Vitals:    19 0001 19 0002 19 0238   BP:  135/69    Pulse:      Resp:      Temp:   97  F (36.1  C)   SpO2: 93%           Electronically Signed By: SRINI Medina CRNA  2019  3:05 AM

## 2019-03-02 NOTE — ANESTHESIA POSTPROCEDURE EVALUATION
Patient: Zara Ibarra    * No procedures listed *    Diagnosis:* No pre-op diagnosis entered *  Diagnosis Additional Information: No value filed.    Anesthesia Type:  No value filed.    Note:  Anesthesia Post Evaluation    Patient location during evaluation: Bedside  Patient participation: Able to fully participate in evaluation  Level of consciousness: awake  Pain management: adequate  Airway patency: patent  Cardiovascular status: acceptable and stable  Respiratory status: acceptable and room air  Hydration status: acceptable  PONV: none     Anesthetic complications: None          Last vitals:  Vitals:    03/01/19 2356 03/02/19 0001 03/02/19 0002   BP:   135/69   Pulse:      Resp:      Temp:      SpO2: 94% 93%          Electronically Signed By: SRINI Medina CRNA  March 2, 2019  12:53 AM

## 2019-03-02 NOTE — PROVIDER NOTIFICATION
03/02/19 0303   Fundal Assessment   Uterus Consistency firm with massage;clots expressed;boggy   Fundal Height U/2  (after clot expressed)   Fundus U/2   Lochia   Lochia Color rubra   Lochia Amount moderate (less than 15 cm on pad/hr)   Clots Large  (baseball sized)   Dr. Maguire called updated on pt's clot @ 0335 and no further clots and light bleeding, and no change on fundal assessment.

## 2019-03-02 NOTE — PROGRESS NOTES
Epidural placed with good pain relief. Some fetal bradycardia initially. Responded to position changes. Zara not hypotensive. Now 9+ cm, +1 station. IUPC placed as well as FSE to better monitor.

## 2019-03-02 NOTE — PROGRESS NOTES
Oxytocin started, but some repetitive late decels recurred even before significant contraction pattern could be established. Dr. Maguire consulted for an ASAP  for intolerance of labor. Oxytocin turned off. Situation explained to the patient and family. Still 9 cm dilated with no cervical change since epidural.

## 2019-03-02 NOTE — ANESTHESIA POSTPROCEDURE EVALUATION
Patient: Zara Ibarra    Procedure(s):   SECTION WITH VIABLE BABY BOY BORN @ 0129.    Diagnosis:* No pre-op diagnosis entered *  Diagnosis Additional Information: No value filed.    Anesthesia Type:  Spinal    Note:  Anesthesia Post Evaluation    Patient location during evaluation: Bedside  Patient participation: Able to participate in evaluation but full recovery from regional anesthesia has not yet ocurrred but is anticipated to occur within 48 hours  Level of consciousness: awake  Pain management: adequate  Airway patency: patent  Cardiovascular status: acceptable and stable  Respiratory status: acceptable and nasal cannula  Hydration status: acceptable             Last vitals:  Vitals:    19 0001 19 0002 19 0238   BP:  135/69    Pulse:      Resp:      Temp:   97  F (36.1  C)   SpO2: 93%           Electronically Signed By: SRINI Medina CRNA  2019  3:07 AM

## 2019-03-02 NOTE — L&D DELIVERY NOTE
"Delivery Summary    Zara Ibarra MRN# [id number]   Age: 18 year old YOB: 2000     ASSESSMENT & PLAN: NRFHT       Labor Event Times    Labor onset date:  3/1/19 Onset time:   7:00 AM      Labor Events     labor?:  No   steroids:  None  Labor Type:  Spontaneous, Augmentation  Predominate monitoring during 1st stage:  intermittent auscultation     Antibiotics received during labor?:  No     Rupture date/time: 3/1/19 0700   Rupture type:  Artificial Rupture of Membranes  Fluid color:  Clear  Fluid odor:  Normal     Augmentation:  Oxytocin  Indications for augmentation:  Ineffective Contraction Pattern  1:1 continuous labor support provided by?:  RN Labor partogram used?:  no      Delivery/Placenta Date and Time    Delivery Date:  3/2/19 Delivery Time:   1:28 AM   Placenta Date/Time:  3/2/2019  1:30 AM  Oxytocin given at the time of delivery:  after delivery of baby     Vaginal Counts     Initial count performed by 2 team members:   Two Team Members   CW RN    TRACY RN        [employer] Suture [employer] Spong[employer] Instruments   Initial counts 1  15 10   Added to count       Final counts       Placed during labor Accounted for at the end of labor           Cord    Vessels:  3 Vessels Complications:  None   Cord Blood Disposition:  Lab Gases Sent?:  No      [place] Measurements    Weight:  7 lb 11.3 oz Length:  1' 8\"   Head circumference:  13.3 cm Chest circumference:  13 cm   Abdominal girth:  12.5 cm  Birth Comments:  Infant taken from OR to mother's room by Dr. Bazzi      Labor Events and Shoulder Dystocia    Fetal Tracing Prior to Delivery:  Category 2  Shoulder dystocia present?:  Neg     Delivery (Maternal) (Provider to Complete) ([id number])       Blood Loss  Mother: Zara Ibarra #[id number]   Start of Mother's Information    IO Blood Loss  19 0700 - 19 0226    EBL (mL) Anesthesia 1000 mL    Total  1000 mL         End of Mother's Information  Mother: Zara Ibarra #[id number] "         Delivery - Provider to Complete ([id number])    Delivering clinician:  Frow, David Franke, MD  Delivery Type (Choose the 1 that will go to the Birth History):  , Low Transverse          Placenta    Immediate Cord Clamping:  Done  Date/Time:  3/2/2019  1:30 AM  Removal:  Spontaneous  Disposition:  Hospital disposal     Anesthesia    Method:  Spinal, General          Presentation and Position    Presentation:  Vertex  Position:  Left Occiput Posterior           Fortino Maguire MD

## 2019-03-02 NOTE — ANESTHESIA PROCEDURE NOTES
Peripheral nerve/Neuraxial procedure note : TAP  Pre-Procedure  Performed by   Referred by DR. ROMERO  Location: OR    Procedure Times:3/2/2019 2:00 AM and 3/2/2019 2:08 AM  Pre-Anesthestic Checklist: patient identified, IV checked, site marked, risks and benefits discussed, informed consent, monitors and equipment checked, pre-op evaluation, at physician/surgeon's request and post-op pain management    Timeout  Correct Patient: Yes   Correct Procedure: Yes   Correct Site: Yes   Correct Laterality: Yes   Correct Position: Yes   Site Marked: Yes   .   Procedure Documentation    .    Procedure:    TAP.     Ultrasound used to identify targeted nerve, plexus, or vascular marker and placed a needle adjacent to it., Ultrasound was used to visualize the spread of the anesthetic in close proximity to the above stated nerve. A permanent image is entered into the patient's record.  Patient Prep;chlorhexidine gluconate and isopropyl alcohol.  .  Needle: insulated Needle Gauge: 22.    Needle Length (Inches) 4  Insertion Method: Single Shot.       Assessment/Narrative  Injection made incrementally with aspirations every 5 mL..  The placement was negative for: blood aspirated and site bleeding.  Bolus given via needle..   Secured via.   Complications:.

## 2019-03-02 NOTE — ANESTHESIA CARE TRANSFER NOTE
Patient: Zara Ibarra    * No procedures listed *    Diagnosis: * No pre-op diagnosis entered *  Diagnosis Additional Information: No value filed.    Anesthesia Type:   No value filed.     Note:  Airway :Room Air  Patient transferred to:Labor and Delivery  Handoff Report: Identifed the Patient, Identified the Reponsible Provider, Reviewed the pertinent medical history, Discussed the surgical course, Reviewed Intra-OP anesthesia mangement and issues during anesthesia, Set expectations for post-procedure period and Allowed opportunity for questions and acknowledgement of understanding      Vitals: (Last set prior to Anesthesia Care Transfer)              Electronically Signed By: SRINI Medina CRNA  March 1, 2019  11:23 PM

## 2019-03-02 NOTE — ANESTHESIA PREPROCEDURE EVALUATION
Anesthesia Pre-Procedure Evaluation    Patient: Zara Ibarra   MRN: [id number] : 2000          Preoperative Diagnosis: * No surgery found *        Past Medical History:   Diagnosis Date     Allergic rhinitis      Asthma     triggered by URIs, does not use any inhalers or treatments.     Environmental allergies      Reflux     takes omeprazole for past year      Suicide attempt (H) 3/15     Tonsillar and adenoid hypertrophy      Past Surgical History:   Procedure Laterality Date     TONSILLECTOMY, ADENOIDECTOMY, COMBINED Bilateral 2015    Procedure: COMBINED TONSILLECTOMY, ADENOIDECTOMY;  Surgeon: Pinky Anglin MD;  Location: [facility] OR       Anesthesia Evaluation       history and physical reviewed .      No history of anesthetic complications          ROS/MED HX    ENT/Pulmonary:     (+)asthma , . .    Neurologic:  - neg neurologic ROS     Cardiovascular:  - neg cardiovascular ROS       METS/Exercise Tolerance:     Hematologic:         Musculoskeletal:         GI/Hepatic:  - neg GI/hepatic ROS       Renal/Genitourinary:         Endo:         Psychiatric:         Infectious Disease:         Malignancy:         Other:                     neg OB ROS            Physical Exam  Normal systems: cardiovascular, pulmonary and dental    Airway   Mallampati: II  TM distance: > 3 FB  Neck ROM: full  Mouth opening: > 3 cm    Dental     Cardiovascular       Pulmonary             Lab Results   Component Value Date    WBC 13.3 (H) 2019    HGB 12.9 2019    HCT 39.2 2019     2019    CRP 16.2 (H) 2017     2018    POTASSIUM 4.0 2018    CHLORIDE 108 2018    CO2 23 2018    BUN 8 2018    CR 0.53 2018    GLC 92 2018    ISA 9.3 2018    ALBUMIN 3.5 04/10/2018    PROTTOTAL 7.5 04/10/2018    ALT 15 04/10/2018    AST 14 04/10/2018    ALKPHOS 109 04/10/2018    BILITOTAL 0.5 04/10/2018    LIPASE 78 04/10/2018    TSH 3.06 2018     "T4 0.64 03/03/2014    HCG Negative 02/24/2017    HCGS Negative 08/04/2017       Preop Vitals  BP Readings from Last 3 Encounters:   03/01/19 (!) 183/82   02/28/19 135/85   02/21/19 143/91    Pulse Readings from Last 3 Encounters:   03/01/19 99   02/28/19 98   01/30/19 101      Resp Readings from Last 3 Encounters:   03/01/19 18   02/28/19 16   02/21/19 16    SpO2 Readings from Last 3 Encounters:   03/01/19 98%   02/28/19 97%   02/21/19 97%      Temp Readings from Last 1 Encounters:   03/01/19 97.9  F (36.6  C) (Oral)    Ht Readings from Last 1 Encounters:   03/01/19 1.803 m (5' 11\")      Wt Readings from Last 1 Encounters:   03/01/19 126.1 kg (278 lb)    Estimated body mass index is 38.77 kg/m  as calculated from the following:    Height as of this encounter: 1.803 m (5' 11\").    Weight as of this encounter: 126.1 kg (278 lb).       Anesthesia Plan      History & Physical Review      ASA Status:  .  OB Epidural Asa: 2            Postoperative Care      Consents                 SRINI Medina CRNA  "

## 2019-03-02 NOTE — OP NOTE
[facility]    OB Brief Operative Note    Pre-operative diagnosis: CAT II FHT's with late decelerations  Delivery remote and not imminent   Post-operative diagnosis Same  OP   Procedure: Procedure(s):   SECTION   Surgeon: Fortino Maguire   Assistants(s): Kyle duff   Anesthesia: Spinal with GET   Estimated blood loss: 1000ml    Total IV fluids: (See anesthesia record)   Blood transfusion: No transfusion was given during surgery   Total urine output: (See anesthesia record)   Drains: [place]   Specimens: Placenta saved   Findings: Male  Cephalic presentation:  occiput posterior  APGARS: 1 minute: 7   5 minute: 8  Placenta: normal appearing  Tubes: normal appearing  Uterus: normal appearing  Ovaries: normal appearin     Complications: None   Condition: Infant stable, transferred to general care nursery  Mother stable, transfered to post-anesthesia recovery   Comments: See dictated operative report for full details

## 2019-03-02 NOTE — ANESTHESIA CARE TRANSFER NOTE
Patient: Zara Ibarra    Procedure(s):   SECTION WITH VIABLE BABY BOY BORN @ 0129.    Diagnosis: * No pre-op diagnosis entered *  Diagnosis Additional Information: No value filed.    Anesthesia Type:   Spinal     Note:  Airway :Nasal Cannula  Patient transferred to:ICU  ICU Handoff: Call for PAUSE to initiate/utilize ICU HANDOFF, Identified Patient, Identified Responsible Provider, Reviewed the Pertinent Medical History, Discussed Surgical Course, Reviewed Intra-OP Anesthesia Management and Issues during Anesthesia, Set Expectations for Post Procedure Period and Allowed Opportunity for Questions and Acknowledgement of Understanding      Vitals: (Last set prior to Anesthesia Care Transfer)    CRNA VITALS  3/2/2019 0204 - 3/2/2019 0245      3/2/2019             Resp Rate (set):  8                Electronically Signed By: SRINI Medina CRNA  2019  2:45 AM

## 2019-03-02 NOTE — ANESTHESIA PREPROCEDURE EVALUATION
Anesthesia Pre-Procedure Evaluation    Patient: Zara Ibarra   MRN: [id number] : 2000          Preoperative Diagnosis: * No pre-op diagnosis entered *    Procedure(s):   SECTION    Past Medical History:   Diagnosis Date     Allergic rhinitis      Asthma     triggered by URIs, does not use any inhalers or treatments.     Environmental allergies      Reflux     takes omeprazole for past year      Suicide attempt (H) 3/15     Tonsillar and adenoid hypertrophy      Past Surgical History:   Procedure Laterality Date     TONSILLECTOMY, ADENOIDECTOMY, COMBINED Bilateral 2015    Procedure: COMBINED TONSILLECTOMY, ADENOIDECTOMY;  Surgeon: Pinky Anglin MD;  Location: [facility] OR       Anesthesia Evaluation     .             ROS/MED HX    ENT/Pulmonary:     (+)Mild Persistent asthma , . .    Neurologic:  - neg neurologic ROS     Cardiovascular:  - neg cardiovascular ROS       METS/Exercise Tolerance:     Hematologic:  - neg hematologic  ROS       Musculoskeletal:  - neg musculoskeletal ROS       GI/Hepatic:  - neg GI/hepatic ROS       Renal/Genitourinary:  - ROS Renal section negative       Endo:     (+) Obesity, .      Psychiatric:  - neg psychiatric ROS       Infectious Disease:  - neg infectious disease ROS       Malignancy:      - no malignancy   Other:    (+) Possibly pregnant                                Lab Results   Component Value Date    WBC 13.3 (H) 2019    HGB 12.9 2019    HCT 39.2 2019     2019    CRP 16.2 (H) 2017     2018    POTASSIUM 4.0 2018    CHLORIDE 108 2018    CO2 23 2018    BUN 8 2018    CR 0.53 2018    GLC 92 2018    ISA 9.3 2018    ALBUMIN 3.5 04/10/2018    PROTTOTAL 7.5 04/10/2018    ALT 15 04/10/2018    AST 14 04/10/2018    ALKPHOS 109 04/10/2018    BILITOTAL 0.5 04/10/2018    LIPASE 78 04/10/2018    TSH 3.06 2018    T4 0.64 2014    HCG Negative 2017    HCGS  "Negative 08/04/2017       Preop Vitals  BP Readings from Last 3 Encounters:   03/02/19 135/69   02/28/19 135/85   02/21/19 143/91    Pulse Readings from Last 3 Encounters:   03/01/19 119   02/28/19 98   01/30/19 101      Resp Readings from Last 3 Encounters:   03/01/19 20   02/28/19 16   02/21/19 16    SpO2 Readings from Last 3 Encounters:   03/02/19 93%   02/28/19 97%   02/21/19 97%      Temp Readings from Last 1 Encounters:   03/01/19 97.9  F (36.6  C) (Oral)    Ht Readings from Last 1 Encounters:   03/01/19 1.803 m (5' 11\")      Wt Readings from Last 1 Encounters:   03/01/19 126.1 kg (278 lb)    Estimated body mass index is 38.77 kg/m  as calculated from the following:    Height as of this encounter: 1.803 m (5' 11\").    Weight as of this encounter: 126.1 kg (278 lb).       Anesthesia Plan      History & Physical Review      ASA Status:  2 .    NPO Status:  > 4 hours    Plan for Spinal   PONV prophylaxis:  Ondansetron (or other 5HT-3) and Dexamethasone or Solumedrol       Postoperative Care  Postoperative pain management:  IV analgesics, Neuraxial analgesia and Peripheral nerve block (Single Shot).      Consents  Anesthetic plan, risks, benefits and alternatives discussed with:  Patient..                 SRINI Medina CRNA  "

## 2019-03-02 NOTE — PROGRESS NOTES
Still about the same. Exhausted. I suspect that she may need augmenting. Requesting epidural. Once that is in, will place IUPC to better assess quality of contractions. FHT category 1. BP's running 140/70's. Suspect is up secondary to pain.

## 2019-03-02 NOTE — PLAN OF CARE
VSS. Assessment done as charted. Pt with SBA marching in place at edge of bed. Tolerating well. Dr. Maguire notified of WBC result. Antibiotics initiated. Visitors at bedside. No requests.

## 2019-03-02 NOTE — OP NOTE
Procedure Date: 2019      PREOPERATIVE DIAGNOSES:   1.  Intrauterine pregnancy at term.   2.  Active labor.   3.  CAT II fetal heart tracings with persistent late decelerations, non-reassuring.     4.  Delivery remote and not eminent.      POSTOPERATIVE DIAGNOSES:   1.  Intrauterine pregnancy at term.   2.  Active labor.   3.  CAT II fetal heart tracings with persistent late decelerations, non-reassuring.     4.  Delivery remote and not eminent.   5.  Occiput posterior presentation.      PROCEDURE:  Primary  section, low transverse cervical uterine incision was single layer closure.      SURGEON:  Fortino Maguire MD      ASSISTANT:  Rickie Cardenas.      ANESTHESIA:  Spinal block, as well as general endotracheal.      ESTIMATED BLOOD LOSS:  1000 mL.      COMPLICATIONS:  None.      CONDITION:  Stable to recovery room.      DRAINS:  Latex free Townsend catheter with drainage of clear urine.      PACKS:  None.      COUNTS:  Sponge and needle count reported to be correct and instrument counts being reported to be correct.      SPECIMENS:  Placenta was saved for potential further examination.      OPERATIVE FINDINGS:  There was a vertex male infant in occiput posterior presentation, 7 pounds 11 ounces, Apgars 7/8.  Amniotic fluid was clear.  Placenta was normal-appearing.  Tubes were normal-appearing.  Uterus was normal-appearing.  Ovaries were normal-appearing.      INDICATIONS:  This is a 18-year-old primigravida patient of Armando Bazzi MD, who is in active labor.  The patient received an epidural anesthesia, had Pitocin augmentation for failure to progress and developed category 2 fetal heart tracings in spite of position change, oxygen therapy and IV fluids.  Fetal heart tones were category 2 with late decelerations.  Cervix at 9 cm.  Delivery was not eminent, as the station was high above 0 and the patient had had a lengthy labor with augmentation up to this point.  Dr. Bazzi recommended   section.  She called me, discussed the clinical situation with me, and I agreed with the indication for  section.  A code  section status was not met, therefore, it an as-soon-as-possible  section was indicated        I came in and evaluated the patient.  I discussed with the patient and gave her informed consent regarding the risks, complications and alternatives to the proposed procedure.  She read a detailed informed consent form, understood and signed it, and I answered all the patient's and her family members' questions.  She was ready to proceed.      DESCRIPTION OF PROCEDURE:  The patient was taken down to the operating room and she had an epidural anesthesia, but it was not satisfactory for  level, therefore was placed in a sitting position and underwent spinal block anesthesia and was placed in the dorsal supine position.  She already had a latex free Townsend catheter.  It was clear urine.  Fetal heart tones were 109.  At this point, we proceeded with a Betadine wash and draping the patient.      A very limited timeout occurred and confirmed procedure, allergy and patient verification.  This was agreed upon by all operating room personnel.      A Pfannenstiel skin incision was made and was carried down to the fascia.  The fascia was incised and extended.  There was blunt and sharp dissection of the rectus fascia away from the rectus muscles.  The parietal peritoneum was entered sharply.  A bladder blade was inserted.  A bladder flap was created.  A low transverse cervical uterine incision was made with a scalpel and extended bluntly.  The infant's head was delivered.  The oral cavity and oropharynx was sucked.  The rest of the baby was delivered.  The cord was bilaterally clamped, cut, and the infant was passed onto the awaiting  team, as well as Dr. Bazzi, acting pediatrician, with a vigorous cry.  A fetal cord sample was obtained for type and Rh.  The placenta had  spontaneous removal and saved for potential further examination.  The uterus was wiped with a moist lap sponge.  The uterus was wrapped with a moist lap sponge.  The uterus was closed with #1 chromic interlocking suture and a few interrupted figure-of-eight chromic sutures were placed for hemostasis.  The uterus was very apneic, even though the Pitocin had been begun as soon as the baby was delivered, 30 units in a 500 mL bag running wide open.  Therefore, the patient was given Hemabate, and the uterus firmed a little bit.  A variety of massage measures were taken.  Pitocin was continued and another dose of Hemabate had to be given.  Urine was clear and present.  There was a search for sponges and there were none.  Sponge and needle count was reported to be correct.  The instrument count was reported to be correct.  There was no active bleeding.  Copious amounts of irrigation was used until the irrigant was clear.  The uterus was firming up at this point.  The parietal peritoneum was reapproximated with 3-0 running Vicryl suture, everting the edges externally.  There was no active bleeding.  The urine was clear and present.  Copious amounts of irrigation was used until the irrigant was clear.  The fascia was run with #1 loop PDS suture.  All surgical knots were buried.  There was no active bleeding.  Sponge and needle count was reported to be correct.  Instrument count was reported be correct.  Urine was clear and present.  At this point another dose of Hemabate was given, as it seemed to be just slightly apneic.  Irrigation was carried out until the irrigant was clear.  The uterus firmed up.  The dead space was round with 3-0 running Vicryl suture.  Copious amounts of irrigation was used.  Irrigant was clear.  There was no active bleeding.  Skin clips were placed.  Pressure was applied.  All the uterine clots were removed.        SURGICAL DEBRIEFING:  At this point, I led the team in a formal surgical debriefing  including procedure, estimated blood loss, postoperative diagnoses, closing count status, pack status, specimens and processing, which were none, wound class, if the team felt like anything else could have been done better, which was none, and concerns regarding recovery.  Anesthesia had to do a general endotracheal at some point during the procedure and therefore, the main risk was general endotracheal anesthesia risk.      The patient was taken to the  Intensive Care Unit because it was the middle of the night for recovery, stable.         RAS ROMERO MD             D: 2019   T: 2019   MT: LAKEISHA      Name:     BHAVANI CONDE   MRN:      [id number]        Account:        [id number]   :      2000           Procedure Date: 2019      Document: [id number]

## 2019-03-02 NOTE — PLAN OF CARE
Pt had epidural placed 2300 for pt request for pain. Dr Bazzi at bedside and aware of pts high BP's, HR and fetal strip.  ok'd the BP and HR. Pt slowly dilating . Dr Bazzi called asap   for recurring decels. Pt repositioned several times and continued to show decels. Nurse at bedside continually monitoring pt and fetal monitor. See flow sheet on strip.  Pt dilated to 9cm at time of . Pt quickly readied and transferred down to OR. Pt agreeable to  and consent signed.

## 2019-03-02 NOTE — PLAN OF CARE
Face to face report given with opportunity to observe patient.    Report given to Karen VILLANUEVA RN and Rosy Cruz   3/1/2019  7:12 PM

## 2019-03-02 NOTE — PLAN OF CARE
Assessments as charted. B/P: 127/70, T: 99.7, P: 98, R: 16. Rates pain: 10. Incision: dressing clean, dry, intact . Voiding without difficulty. Fundus firm. Lochia: Light. Activity: remains on bedrest at this time. Will increase activity throughout day . Infant feeding: Breast feeding going well.     LATCH Score:   Latch: 2 - Good Latch  Audible Swallowin - Spontaneous & frequent  Type of Nipple: (Breast/Nipple) 2 - Everted  Comfort: 2 - Soft, Nontender  Hold: 0 - Full Assist   Total LATCH Score: 8    Postpartum breastfeeding assessment completed and education provided, see Patient Education Activity.  Items included in the education are:   proper positioning and latch  effectiveness of feeding  manual expression  handling and storing breastmilk  maintenance of breastfeeding for the first 6 months  sign/symptoms of infant feeding issues requiring referral to qualified health care provider  Postpartum care education provided, see Patient Education activity. Patient denies needs. Will monitor.  Hortencia Urrutia

## 2019-03-02 NOTE — PROGRESS NOTES
"Contractions more frequent. Tends to have two in a row with the second one shorter. Cervix 8 cm, 0 station. I checked her half an hour ago during a contraction. Cervix not stretching as tight as I would like to see (dystonic contraction). Now stretched tight. She is complaining of more \"pressure\". Small forebag ruptured. Was concerned that I might have to augment her. Will wait.  "

## 2019-03-02 NOTE — PLAN OF CARE
Fetal monitor tracing present with late and prolonged decelerations, Dr. Bazzi at bedside. Pt just had the epidural placed and was to lay equally on her back to allow for the pain medication to set up. Fetal tracing not tolerating position. Position was attempted to be repositioned, Dr. Bazzi denies this action as she states she is in the room and assumes all responsibility at this point. Fetal tracing did return to baseline, with recurrent late decelerations following.

## 2019-03-02 NOTE — OR NURSING
Pateint discharged to [facility].  Chuck score 10/10. Pain level 0/10.  Discharged from unit via cart.  Hand off report given to Karen GLOVER

## 2019-03-02 NOTE — PLAN OF CARE
Face to face report given with opportunity to observe patient.    Report given to Hortencia GLOVER.    Karen Raphael   3/2/2019  7:28 AM

## 2019-03-02 NOTE — PROGRESS NOTES
Had a decel to the 80's not associated with a contraction. Responded to position change. Oxytocin now being started. Will follow closely for tolerance of labor as contractions . Still 9 cm, but head not applied as tightly to the cervix.  Molding noted. Is BILLY position.

## 2019-03-02 NOTE — PLAN OF CARE
Assessments as charted. B/P: 163/82, T: 98.7, P: 99, R: 16. Rates pain: 5/10  incisonal. Townsend draining clear yellow urine. Fundus firm . Lochia: Light. Activity: moving with difficulty due to post op surgical pain. Infant feeding: Formula.     LATCH Score:   Latch: 1 - Repeated Attempts  Audible Swallowin - Few  Type of Nipple: (Breast/Nipple) 2 - Everted  Comfort: 2 - Soft, Nontender  Hold: 0 - Full Assist   Total LATCH Score: 6    Postpartum breastfeeding assessment completed and education provided, see Patient Education Activity.  Items included in the education are:     proper positioning and latch    effectiveness of feeding    manual expression    handling and storing breastmilk    maintenance of breastfeeding for the first 6 months    sign/symptoms of infant feeding issues requiring referral to qualified health care provider  Postpartum care education provided, see Patient Education activity. Patient denies needs. Will monitor.  Karen Raphael

## 2019-03-02 NOTE — PROGRESS NOTES
S:    Called regarding CAT II fht's with late onset and delivery not imminent by Dr. Bazzi    O:   Strip reviewed and agree with indication for C/S    A:    CAT II FHT's with late onset         Delivery is not imminent and remote    P:   C/S        ICG        CFRUS x 1        RBEAO explained and all questions answered.

## 2019-03-03 PROBLEM — D62 ANEMIA DUE TO BLOOD LOSS, ACUTE: Status: ACTIVE | Noted: 2019-03-03

## 2019-03-03 LAB
CREAT SERPL-MCNC: 0.54 MG/DL (ref 0.52–1.04)
GFR SERPL CREATININE-BSD FRML MDRD: >90 ML/MIN/{1.73_M2}
HGB BLD-MCNC: 10.1 G/DL (ref 11.7–15.7)

## 2019-03-03 PROCEDURE — 25000132 ZZH RX MED GY IP 250 OP 250 PS 637: Performed by: OBSTETRICS & GYNECOLOGY

## 2019-03-03 PROCEDURE — 36415 COLL VENOUS BLD VENIPUNCTURE: CPT | Performed by: OBSTETRICS & GYNECOLOGY

## 2019-03-03 PROCEDURE — 25000128 H RX IP 250 OP 636: Performed by: OBSTETRICS & GYNECOLOGY

## 2019-03-03 PROCEDURE — 82565 ASSAY OF CREATININE: CPT | Performed by: FAMILY MEDICINE

## 2019-03-03 PROCEDURE — 12000000 ZZH R&B MED SURG/OB

## 2019-03-03 PROCEDURE — 85018 HEMOGLOBIN: CPT | Performed by: OBSTETRICS & GYNECOLOGY

## 2019-03-03 PROCEDURE — 25000125 ZZHC RX 250: Performed by: OBSTETRICS & GYNECOLOGY

## 2019-03-03 RX ADMIN — OXYCODONE HYDROCHLORIDE AND ACETAMINOPHEN 2 TABLET: 5; 325 TABLET ORAL at 04:34

## 2019-03-03 RX ADMIN — OXYCODONE HYDROCHLORIDE AND ACETAMINOPHEN 2 TABLET: 5; 325 TABLET ORAL at 13:31

## 2019-03-03 RX ADMIN — OXYCODONE HYDROCHLORIDE AND ACETAMINOPHEN 2 TABLET: 5; 325 TABLET ORAL at 21:43

## 2019-03-03 RX ADMIN — CLINDAMYCIN IN 5 PERCENT DEXTROSE 900 MG: 18 INJECTION, SOLUTION INTRAVENOUS at 03:11

## 2019-03-03 RX ADMIN — SODIUM CHLORIDE, SODIUM LACTATE, POTASSIUM CHLORIDE, CALCIUM CHLORIDE AND DEXTROSE MONOHYDRATE: 5; 600; 310; 30; 20 INJECTION, SOLUTION INTRAVENOUS at 10:39

## 2019-03-03 RX ADMIN — CEFOXITIN SODIUM 2 G: 2 INJECTION, SOLUTION INTRAVENOUS at 05:38

## 2019-03-03 RX ADMIN — CEFOXITIN SODIUM 2 G: 2 INJECTION, SOLUTION INTRAVENOUS at 17:37

## 2019-03-03 RX ADMIN — CEFOXITIN SODIUM 2 G: 2 INJECTION, SOLUTION INTRAVENOUS at 23:16

## 2019-03-03 RX ADMIN — OXYCODONE HYDROCHLORIDE AND ACETAMINOPHEN 2 TABLET: 5; 325 TABLET ORAL at 08:57

## 2019-03-03 RX ADMIN — IBUPROFEN 800 MG: 800 TABLET ORAL at 07:50

## 2019-03-03 RX ADMIN — CLINDAMYCIN IN 5 PERCENT DEXTROSE 900 MG: 18 INJECTION, SOLUTION INTRAVENOUS at 19:31

## 2019-03-03 RX ADMIN — OXYCODONE HYDROCHLORIDE AND ACETAMINOPHEN 2 TABLET: 5; 325 TABLET ORAL at 17:44

## 2019-03-03 RX ADMIN — CEFOXITIN SODIUM 2 G: 2 INJECTION, SOLUTION INTRAVENOUS at 11:37

## 2019-03-03 RX ADMIN — CLINDAMYCIN IN 5 PERCENT DEXTROSE 900 MG: 18 INJECTION, SOLUTION INTRAVENOUS at 10:39

## 2019-03-03 NOTE — PLAN OF CARE
Face to face report given with opportunity to observe patient.    Report given to Rosy ZARAGOZA RN.    Karen Raphael   3/3/2019  7:18 AM

## 2019-03-03 NOTE — PLAN OF CARE
Face to face report given with opportunity to observe patient.    Report given to Karen Urrutia   3/2/2019  6:56 PM

## 2019-03-03 NOTE — PLAN OF CARE
Epidural cartridge Fentanyl/bup2/0.125% (125ml) wasted 70 mls due to partial dose. Rosy Burroughs and Christie Lucas as witnesses.

## 2019-03-03 NOTE — PLAN OF CARE
Assessments as charted. B/P: 118/57, T: 98.6, P: 97, R: 18. Rates pain: 7/10, relief with prn percocet. Incision: Healing well, well approximated and without signs of infection. Voiding without difficulty. Fundus firm, U-2. Lochia: Light. Activity: normal activity. Infant feeding: Breast feeding going not well.      LATCH Score:   Latch: 1 - Repeated Attempts  Audible Swallowin - Few  Type of Nipple: (Breast/Nipple) 1-Flat  Comfort: 2 - Soft, Nontender  Hold: 0 - Full Assist   Total LATCH Score: 5    Postpartum breastfeeding assessment completed and education provided, see Patient Education Activity.  Items included in the education are:   proper positioning and latch  effectiveness of feeding  manual expression  handling and storing breastmilk  maintenance of breastfeeding for the first 6 months  sign/symptoms of infant feeding issues requiring referral to qualified health care provider  Postpartum care education provided, see Patient Education activity. Patient denies needs. Will monitor.  Sonia Olmstead RN

## 2019-03-03 NOTE — PLAN OF CARE
Assessments as charted. B/P: 137/65, T: 99, P: 98, R: 16. Rates pain: 6/10 incisional pain. Bandage CDI .Voiding without difficulty. Fundus Firm. Lochia: Light. Activity: moving with difficulty due to post op surgical pain. Infant feeding: Breast feeding going fair.    LATCH Score:   Latch: 1 - Repeated Attempts  Audible Swallowin - Spontaneous & frequent  Type of Nipple: (Breast/Nipple) 2 - Everted  Comfort: 2 - Soft, Nontender  Hold: 1 - Min. Assist   Total LATCH Score:     Postpartum breastfeeding assessment completed and education provided, see Patient Education Activity.  Items included in the education are:   proper positioning and latch  effectiveness of feeding  manual expression  handling and storing breastmilk  maintenance of breastfeeding for the first 6 months  sign/symptoms of infant feeding issues requiring referral to qualified health care provider  Postpartum care education provided, see Patient Education activity. Patient denies needs. Will monitor.  Karen Raphael

## 2019-03-03 NOTE — PHARMACY
[facility]    Pharmacy      Antimicrobial Stewardship Note     Current antimicrobial therapy:  Anti-infectives (From now, onward)    Start     Dose/Rate Route Frequency Ordered Stop    03/02/19 1630  clindamycin (CLEOCIN) infusion 900 mg      900 mg  50 mL/hr over 60 Minutes Intravenous EVERY 8 HOURS 03/02/19 1626      03/02/19 1630  cefOXitin (MEFOXIN) 2 g in dextrose 50 mL pre-mix intermittent infusion      2 g  100 mL/hr over 30 Minutes Intravenous EVERY 6 HOURS 03/02/19 1626          Indication: Possible post-op infection    Days of Therapy: 2     Pertinent labs:  Creatinine   Creatinine   Date Value Ref Range Status   03/03/2019 0.54 0.52 - 1.04 mg/dL Final   05/27/2018 0.53 0.50 - 1.00 mg/dL Final   04/10/2018 0.64 0.50 - 1.00 mg/dL Final     WBC   WBC   Date Value Ref Range Status   03/02/2019 22.5 (H) 4.0 - 11.0 10e9/L Final   03/01/2019 13.3 (H) 4.0 - 11.0 10e9/L Final   05/27/2018 9.1 4.0 - 11.0 10e9/L Final     Procalcitonin No results found for: PCAL  CRP   CRP Inflammation   Date Value Ref Range Status   01/05/2017 16.2 (H) 0.0 - 8.0 mg/L Final       Culture Results: n/a     Recommendations/Interventions:  1. Consider adding stop time if acceptable. No other recommendations at this time. Will continue to monitor.      Gladys Carreon, [facility]  March 3, 2019

## 2019-03-03 NOTE — PLAN OF CARE
Assessments as charted. B/P: 113/60, T: 98.6, P: 96, R: 18. Rates pain: 8/10, relief with prn percocet. Incision: Healing well and without signs of infection. Voiding without difficulty. Fundus firm, U/2. Lochia: Light. Activity: patient up to shower and ambulating and room. Infant feeding: Breast feeding going not well. Formula supplementation last night. Will continue to work on latch, hand-expression and positioning.     LATCH Score:   Latch: 1 - Repeated Attempts  Audible Swallowin - Few  Type of Nipple: (Breast/Nipple) 1 - Flat  Comfort: 2 - Soft, Nontender  Hold: 1 - Min. Assist   Total LATCH Score: 6    Postpartum breastfeeding assessment completed and education provided, see Patient Education Activity.  Items included in the education are:   proper positioning and latch  effectiveness of feeding  manual expression  handling and storing breastmilk  maintenance of breastfeeding for the first 6 months  sign/symptoms of infant feeding issues requiring referral to qualified health care provider  Postpartum care education provided, see Patient Education activity. Patient denies needs. Will monitor.  Sonia Olmstead RN

## 2019-03-04 LAB
BASOPHILS # BLD AUTO: 0 10E9/L (ref 0–0.2)
BASOPHILS NFR BLD AUTO: 0.4 %
DIFFERENTIAL METHOD BLD: ABNORMAL
EOSINOPHIL # BLD AUTO: 0.1 10E9/L (ref 0–0.7)
EOSINOPHIL NFR BLD AUTO: 1.2 %
ERYTHROCYTE [DISTWIDTH] IN BLOOD BY AUTOMATED COUNT: 15.5 % (ref 10–15)
HCT VFR BLD AUTO: 30.4 % (ref 35–47)
HGB BLD-MCNC: 10.2 G/DL (ref 11.7–15.7)
IMM GRANULOCYTES # BLD: 0.1 10E9/L (ref 0–0.4)
IMM GRANULOCYTES NFR BLD: 1.2 %
LYMPHOCYTES # BLD AUTO: 2.4 10E9/L (ref 0.8–5.3)
LYMPHOCYTES NFR BLD AUTO: 22 %
MCH RBC QN AUTO: 27.2 PG (ref 26.5–33)
MCHC RBC AUTO-ENTMCNC: 33.6 G/DL (ref 31.5–36.5)
MCV RBC AUTO: 81 FL (ref 78–100)
MONOCYTES # BLD AUTO: 1 10E9/L (ref 0–1.3)
MONOCYTES NFR BLD AUTO: 9.2 %
NEUTROPHILS # BLD AUTO: 7.1 10E9/L (ref 1.6–8.3)
NEUTROPHILS NFR BLD AUTO: 66 %
NRBC # BLD AUTO: 0 10*3/UL
NRBC BLD AUTO-RTO: 0 /100
PLATELET # BLD AUTO: 267 10E9/L (ref 150–450)
RBC # BLD AUTO: 3.75 10E12/L (ref 3.8–5.2)
WBC # BLD AUTO: 10.8 10E9/L (ref 4–11)

## 2019-03-04 PROCEDURE — 25000128 H RX IP 250 OP 636: Performed by: OBSTETRICS & GYNECOLOGY

## 2019-03-04 PROCEDURE — 25000132 ZZH RX MED GY IP 250 OP 250 PS 637: Performed by: OBSTETRICS & GYNECOLOGY

## 2019-03-04 PROCEDURE — 25000125 ZZHC RX 250: Performed by: OBSTETRICS & GYNECOLOGY

## 2019-03-04 PROCEDURE — 85025 COMPLETE CBC W/AUTO DIFF WBC: CPT | Performed by: OBSTETRICS & GYNECOLOGY

## 2019-03-04 PROCEDURE — 36415 COLL VENOUS BLD VENIPUNCTURE: CPT | Performed by: OBSTETRICS & GYNECOLOGY

## 2019-03-04 PROCEDURE — 12000000 ZZH R&B MED SURG/OB

## 2019-03-04 RX ORDER — AMOXICILLIN 250 MG
2 CAPSULE ORAL 2 TIMES DAILY
Status: DISCONTINUED | OUTPATIENT
Start: 2019-03-04 | End: 2019-03-04

## 2019-03-04 RX ORDER — AMOXICILLIN 250 MG
1 CAPSULE ORAL 2 TIMES DAILY
Status: DISCONTINUED | OUTPATIENT
Start: 2019-03-04 | End: 2019-03-04

## 2019-03-04 RX ADMIN — IBUPROFEN 800 MG: 800 TABLET ORAL at 08:20

## 2019-03-04 RX ADMIN — OXYCODONE HYDROCHLORIDE AND ACETAMINOPHEN 2 TABLET: 5; 325 TABLET ORAL at 05:00

## 2019-03-04 RX ADMIN — OXYCODONE HYDROCHLORIDE AND ACETAMINOPHEN 2 TABLET: 5; 325 TABLET ORAL at 10:50

## 2019-03-04 RX ADMIN — CEFOXITIN SODIUM 2 G: 2 INJECTION, SOLUTION INTRAVENOUS at 05:05

## 2019-03-04 RX ADMIN — DOCUSATE SODIUM 50 MG: 50 CAPSULE, LIQUID FILLED ORAL at 09:21

## 2019-03-04 RX ADMIN — CLINDAMYCIN IN 5 PERCENT DEXTROSE 900 MG: 18 INJECTION, SOLUTION INTRAVENOUS at 03:09

## 2019-03-04 RX ADMIN — DOCUSATE SODIUM 100 MG: 50 CAPSULE, LIQUID FILLED ORAL at 23:02

## 2019-03-04 RX ADMIN — OXYCODONE HYDROCHLORIDE AND ACETAMINOPHEN 2 TABLET: 5; 325 TABLET ORAL at 19:28

## 2019-03-04 RX ADMIN — IBUPROFEN 800 MG: 800 TABLET ORAL at 23:02

## 2019-03-04 RX ADMIN — IBUPROFEN 800 MG: 800 TABLET ORAL at 16:37

## 2019-03-04 NOTE — PLAN OF CARE
"Pt up ad claudia in the room, tolerating activity well.  Pt encouraged to ambulate in the halls but stated, I'm just walking around in my room\".  Pt getting ibuprofen and percocet as needed for pain, reports adequate pain control.  Minimal assistance provided with latching baby on for feedings.  Hand expression reviewed and pt return demonstrated it.  Pt is doing well, denies any complaints.  Aware to call with any questions or concerns.    "

## 2019-03-04 NOTE — PLAN OF CARE
Assessments as charted. B/P: 128/66, T: 99.1, P: 101, R: 16. Rates pain: 3/10 incisional. Incision:well approximated and without signs of infection. Voiding without difficulty. Fundus firm. Lochia: Light. Activity: moving with difficulty due to post op surgical pain. Infant feeding: Breast feeding going better with assist of Nurse .     LATCH Score:   Latch: 2 - Good Latch  Audible Swallowin - Spontaneous & frequent  Type of Nipple: (Breast/Nipple) 1 - Flat  Comfort: 2 - Soft, Nontender  Hold: 1 - Min. Assist   Total LATCH Score: 8    Postpartum breastfeeding assessment completed and education provided, see Patient Education Activity.  Items included in the education are:   proper positioning and latch  effectiveness of feeding  manual expression  handling and storing breastmilk  maintenance of breastfeeding for the first 6 months  sign/symptoms of infant feeding issues requiring referral to qualified health care provider  Postpartum care education provided, see Patient Education activity. Patient denies needs. Will monitor.  Karen Raphael

## 2019-03-04 NOTE — PROGRESS NOTES
"OB/GYN  POD # 2. S/p CS    Pt still sore, no BM, +flatus  Does not feel ready for discharge    /66   Pulse 101   Temp 99.1  F (37.3  C) (Oral)   Resp 16   Ht 1.803 m (5' 11\")   Wt 126.1 kg (278 lb)   SpO2 98%   Breastfeeding? Unknown   BMI 38.77 kg/m    Gen - NAD  Abd- appropriate tenderness  Incision - C/D/I - staples  Extremities - No CT, No edema    A/P POD # 2, s/p    1. Stop abx   2. Ambulate   3. Sched stool softeners   4. Expect discharge POD # 3    RAS WARNER MD       "

## 2019-03-04 NOTE — PROGRESS NOTES
FOB: significant other Shashi  Marital status: single  ROP: they are aware and intending to complete this before they discharge    Who was present during assessment: Randall, with baby  Lives at: her moms home in [place]  Lives with: her mom  Support System: Shashi, family and a few friends    Primary PCP:  No Ref-Primary, Physician; she was receptive of the list of [facility] providers who are taking new patients which [facility] she is hoping to have Dr Bazzi as her PCP.  Baby PCP: mom is hoping for Dr Bazzi to follow  Insurance: [employer] [place]    Agency Supports: [facility]  Mental Health: has a history of anxiety and depression which she feels is well controlled at this time; feels she would be willing to talk with a provider about her mental health if it became worrisome for her in the future.  Violence: not asked  Substance Abuse: denies smoking and alcohol use; smoked THC daily until she found out she was pregnant. They both voice an awareness that they should not use any substance around or while caring for the baby.     Adequate resources for needs (housing, utilities, food/med): yes; they are working on securing housing to accommodate the 3 of them; they were receptive of the list of community resources for extra supports such as housing and food supports.    Transportation:  She does not drive and usually gets rides from her mom or uses public transportation  Car Seat: Yes    Education concerns on self/baby care:   She feels confident she can care for herself and . We reviewed some of her options for additional support if she has questions after discharge.    Community Resources offered: as above.

## 2019-03-04 NOTE — PLAN OF CARE
Face to face report given with opportunity to observe patient.    Report given to Rae GLOVER.    Karen Raphael   3/4/2019  7:16 AM

## 2019-03-04 NOTE — PLAN OF CARE
Face to face report given with opportunity to observe patient.    Report given to Karen Olmstead   3/3/2019  7:26 PM       Speaking Coherently

## 2019-03-05 VITALS
BODY MASS INDEX: 38.92 KG/M2 | HEIGHT: 71 IN | RESPIRATION RATE: 18 BRPM | SYSTOLIC BLOOD PRESSURE: 129 MMHG | WEIGHT: 278 LBS | DIASTOLIC BLOOD PRESSURE: 62 MMHG | OXYGEN SATURATION: 96 % | HEART RATE: 101 BPM | TEMPERATURE: 98.9 F

## 2019-03-05 LAB — COPATH REPORT: NORMAL

## 2019-03-05 PROCEDURE — 25000132 ZZH RX MED GY IP 250 OP 250 PS 637: Performed by: OBSTETRICS & GYNECOLOGY

## 2019-03-05 RX ORDER — OXYCODONE AND ACETAMINOPHEN 5; 325 MG/1; MG/1
1-2 TABLET ORAL EVERY 4 HOURS PRN
Qty: 30 TABLET | Refills: 0 | Status: SHIPPED | OUTPATIENT
Start: 2019-03-05 | End: 2020-07-28

## 2019-03-05 RX ORDER — IBUPROFEN 800 MG/1
800 TABLET, FILM COATED ORAL EVERY 6 HOURS PRN
Qty: 50 TABLET | Refills: 1 | Status: SHIPPED | OUTPATIENT
Start: 2019-03-05 | End: 2021-04-07

## 2019-03-05 RX ADMIN — OXYCODONE HYDROCHLORIDE AND ACETAMINOPHEN 2 TABLET: 5; 325 TABLET ORAL at 01:13

## 2019-03-05 RX ADMIN — IBUPROFEN 800 MG: 800 TABLET ORAL at 05:40

## 2019-03-05 RX ADMIN — DOCUSATE SODIUM 100 MG: 50 CAPSULE, LIQUID FILLED ORAL at 08:36

## 2019-03-05 RX ADMIN — OXYCODONE HYDROCHLORIDE AND ACETAMINOPHEN 2 TABLET: 5; 325 TABLET ORAL at 08:36

## 2019-03-05 NOTE — PLAN OF CARE
Assessments as charted. B/P: 129/62, T: 98.9, P: 101, R: 18. Rates pain: 3/10 pt reports adequate pain control with prn pain meds. Incision: Healing well, well approximated, without signs of infection and no drainage. Voiding without difficulty. Fundus firm at U-2. Lochia: Light. Activity: slightly limited due to incisional pain. Infant feeding: Breast feeding going well.     LATCH Score:   Latch: 2 - Good Latch  Audible Swallowin - Spontaneous & frequent  Type of Nipple: (Breast/Nipple) 2 - Everted  Comfort: 1 - Filling, small blisters, mild/mod pain  Hold: 2 - No Assist   Total LATCH Score: 9    Postpartum breastfeeding assessment completed and education provided, see Patient Education Activity.  Items included in the education are:   proper positioning and latch  effectiveness of feeding  manual expression  handling and storing breastmilk  maintenance of breastfeeding for the first 6 months  sign/symptoms of infant feeding issues requiring referral to qualified health care provider  Postpartum care education provided, see Patient Education activity. Patient denies needs. Will monitor.  Elvia Castellanos

## 2019-03-05 NOTE — PLAN OF CARE
Assessments as charted. B/P: 140/63, T: 98.4, P: 101, R: 18. Rates pain: 6/10 at incision site. Incision: Healing well and without signs of infection. Voiding without difficulty. Fundus firm and 2 below. Lochia: Light. Activity: moving with mild difficulty d/t incisional pain. Infant feeding: Breast feeding going well.      LATCH Score:   Latch: 2 - Good Latch  Audible Swallowin - Spontaneous & frequent  Type of Nipple: (Breast/Nipple) 2 - Everted  Comfort: 1 - Filling, small blisters, mild/mod pain  Hold: 2 - No Assist   Total LATCH Score: 9      Postpartum breastfeeding assessment completed and education provided, see Patient Education Activity.  Postpartum care education provided, see Patient Education activity. Patient denies needs. Will monitor.  Kellie Khan

## 2019-03-05 NOTE — PLAN OF CARE
Face to face report given with opportunity to observe patient.    Report given to ADALBERTO Mcbride   3/5/2019  6:54 AM

## 2019-03-05 NOTE — PROGRESS NOTES
"OB/GYN    POD # 3  Discharge today    EXAM  /63   Pulse 101   Temp 98.4  F (36.9  C) (Oral)   Resp 18   Ht 1.803 m (5' 11\")   Wt 126.1 kg (278 lb)   SpO2 98%   Breastfeeding? Unknown   BMI 38.77 kg/m    Gen - NAD  Abdomen - soft, non-tender  Incision - C/D/I  Extremities - trace edema, no CT    A/P POD # 3, s/p    1. Discharge today   2. F/u staple removal Friday in clinic   3. F/u 6 weeks, PP visit    RAS WARNER MD      "

## 2019-03-05 NOTE — PLAN OF CARE
Patient discharged at 12:12 PM via ambulation accompanied by significant other and s.o. mother and staff. Prescriptions sent with patient to fill . All belongings sent with patient.     Discharge instructions reviewed with patient and s.o. Listed belongings gathered and returned to patient.     Patient discharged to home.     Surgical Patient   Surgical Procedures during stay:   Did patient receive discharge instruction on wound care and recognition of infection symptoms? Yes    MISC  Follow up appointment made:  Yes  Home and hospital aquired medications returned to patient: N/A  Patient reports pain was well managed at discharge: Yes

## 2019-03-05 NOTE — DISCHARGE SUMMARY
[facility] Discharge Summary    Zara Ibarra MRN# [id number]   Age: 18 year old YOB: 2000     Date of Admission:  3/1/2019  Date of Discharge::  3/5/2019  Admitting Physician:  David Franke Frow, MD  Discharge Physician:  Fortino Smart MD                Admission Diagnoses:   Encounter for triage in pregnant patient  Normal labor   delivery delivered          Discharge Diagnosis:    delivery          Procedures:   Procedure(s): Primary low transverse  section       No other procedures performed during this admission           Medications Prior to Admission:     Medications Prior to Admission   Medication Sig Dispense Refill Last Dose     Prenatal Vit-Fe Fumarate-FA (PRENATAL MULTIVITAMIN W/IRON) 27-0.8 MG tablet Take 1 tablet by mouth daily   Past Week at Unknown time             Discharge Medications:     Current Discharge Medication List      START taking these medications    Details   docusate sodium (COLACE) 50 MG capsule Take 1-2 capsules ( mg) by mouth 2 times daily  Qty: 30 capsule, Refills: 0    Associated Diagnoses:  delivery delivered      ibuprofen (ADVIL/MOTRIN) 800 MG tablet Take 1 tablet (800 mg) by mouth every 6 hours as needed for other (, post-op pain)  Qty: 50 tablet, Refills: 1    Associated Diagnoses:  delivery delivered      oxyCODONE-acetaminophen (PERCOCET) 5-325 MG tablet Take 1-2 tablets by mouth every 4 hours as needed for pain  Qty: 30 tablet, Refills: 0    Associated Diagnoses:  delivery delivered         CONTINUE these medications which have NOT CHANGED    Details   Prenatal Vit-Fe Fumarate-FA (PRENATAL MULTIVITAMIN W/IRON) 27-0.8 MG tablet Take 1 tablet by mouth daily                   Consultations:   No consultations were requested during this admission          Brief History of Labor or Admission:   See labor notes           Hospital Course:   The patient's hospital course was unremarkable.  She  recovered as anticipated and experienced no post-operative complications.   On discharge, her pain was well controlled.  Vaginal bleeding is similar to peak menstrual flow.  Voiding without difficulty.  Ambulating well and tolerating a normal diet.  No fever or significant wound drainage.  Breastfeeding  well.  Infant is stable. No bowel movement but +flatus   She was discharged on post-partum day #3 .    Post-partum hemoglobin:   Hemoglobin   Date Value Ref Range Status   03/04/2019 10.2 (L) 11.7 - 15.7 g/dL Final             Discharge Instructions and Follow-Up:   Discharge diet: Regular   Discharge activity: Lifting restricted to 20 pounds   Discharge follow-up: Follow up with clinic in 3 days for staple removal   Wound care: Drink plenty of fluids  Ice to area for comfort  Keep wound clean and dry  Allred out in 3 days           Discharge Disposition:   Discharged to home      Attestation:  I have reviewed today's vital signs, notes, medications, and labs    Fortino Smart MD

## 2019-03-05 NOTE — PLAN OF CARE
Face to face report given with opportunity to observe patient.    Report given to Zaira Castellanos   3/4/2019  7:36 PM

## 2019-03-05 NOTE — DISCHARGE INSTRUCTIONS
Appointments  Date: 19  Time: 2:15 PM  Provider: Jluis  Location: [facility]    Postop  Birth Instructions    Activity    Do not lift more than 10 pounds for 6 weeks after surgery.  Ask family and friends for help when you need it.  No driving until you have stopped taking your narcotic pain medications   No heavy exercise or activity for 6 weeks.  Don't do anything that will put a strain on your surgery site.  Don't strain when using the toilet.  Your care team may prescribe a stool softener if you have problems with your bowel movements.    To care for your incision:    Keep the incision clean and dry.  Do not soak your incision in water. No swimming or hot tubs until it has fully healed. You may soak in the bathtub if the water level is below your incision.  Do not use peroxide, gel, cream, lotion, or ointment on your incision.  Adjust your clothes to avoid pressure on your surgery site (check the elastic in your underwear for example).    You may see a small amount of clear or pink drainage and this is normal.    Check with your health care provider:    If the drainage increases or has an odor.  If the incision reddens, you have swelling, or develop a rash.  If you have increased pain and the medicine we prescribed doesn't help.  If you have a fever above 100.4 F (38 C) with or without chills when placing thermometer under your tongue.  The area around your incision (surgery wound), will feel numb.  This is normal. The numbness should go away in less than a year.     Keep your hands clean:  Always wash your hands before touching your incision (surgery wound). This helps reduce your risk of infection. If your hands aren't dirty, you may use an alcohol hand-rub to clean your hands. Keep your nails clean and short.    Call your healthcare provider if you have any of these symptoms:    You soak a sanitary pad with blood within 1 hour, or you see blood clots larger than a golf  ball.  Bleeding that lasts more than 6 weeks.  Vaginal discharge that smells bad.  Severe pain, cramping or tenderness in your lower belly area.  A need to urinate more frequently (use the toilet more often), more urgently (use the toilet very quickly), or it burns when you urinate.  Nausea and vomiting.  Redness, swelling or pain around a vein in your leg.  Problems breastfeeding or a red or painful area on your breast.  Chest pain and cough or are gasping for air.  Problems with coping with sadness, anxiety or depression. If you have concerns about hurting yourself or the baby, call your provider immediately. The [facility] law allows you to bring your baby to the hospital up to 7 days after birth, no questions asked.  You have questions or concerns after you return home.

## 2019-03-08 ENCOUNTER — OFFICE VISIT (OUTPATIENT)
Dept: OBGYN | Facility: OTHER | Age: 19
End: 2019-03-08
Attending: OBSTETRICS & GYNECOLOGY
Payer: COMMERCIAL

## 2019-03-08 VITALS
BODY MASS INDEX: 37.24 KG/M2 | OXYGEN SATURATION: 98 % | HEIGHT: 71 IN | HEART RATE: 89 BPM | DIASTOLIC BLOOD PRESSURE: 80 MMHG | WEIGHT: 266 LBS | TEMPERATURE: 98 F | SYSTOLIC BLOOD PRESSURE: 120 MMHG

## 2019-03-08 DIAGNOSIS — Z98.890 POSTOPERATIVE STATE: Primary | ICD-10-CM

## 2019-03-08 PROCEDURE — 99024 POSTOP FOLLOW-UP VISIT: CPT | Performed by: OBSTETRICS & GYNECOLOGY

## 2019-03-08 PROCEDURE — G0463 HOSPITAL OUTPT CLINIC VISIT: HCPCS

## 2019-03-08 ASSESSMENT — MIFFLIN-ST. JEOR: SCORE: 2072.7

## 2019-03-08 ASSESSMENT — PAIN SCALES - GENERAL: PAINLEVEL: NO PAIN (0)

## 2019-03-08 NOTE — PROGRESS NOTES
"OB GYN    Staple removal    Pt has no complaints    EXAM  /80 (BP Location: Left arm, Patient Position: Chair, Cuff Size: Adult Regular)   Pulse 89   Temp 98  F (36.7  C) (Tympanic)   Ht 1.803 m (5' 11\")   Wt 120.7 kg (266 lb)   SpO2 98%   BMI 37.10 kg/m    Gen - NAD  Abdomen soft, non-tender  Incision - C/D/I - staples, no erythema  Extremities - no edema, no CT    A/P Post-op check   Wound healing well   Staples removed   Steri's placed   F/u 4-6 weeks    RAS WARNER MD       "

## 2019-03-08 NOTE — NURSING NOTE
"Chief Complaint   Patient presents with     Post-op Visit     Staple removal and follow up.       Initial /80 (BP Location: Left arm, Patient Position: Chair, Cuff Size: Adult Regular)   Pulse 89   Temp 98  F (36.7  C) (Tympanic)   Ht 1.803 m (5' 11\")   Wt 120.7 kg (266 lb)   SpO2 98%   BMI 37.10 kg/m   Estimated body mass index is 37.1 kg/m  as calculated from the following:    Height as of this encounter: 1.803 m (5' 11\").    Weight as of this encounter: 120.7 kg (266 lb).  Medication Reconciliation: complete    THOMAS WRIGHT LPN    "

## 2020-07-28 ENCOUNTER — HOSPITAL ENCOUNTER (EMERGENCY)
Facility: HOSPITAL | Age: 20
Discharge: HOME | End: 2020-07-28
Attending: EMERGENCY MEDICINE | Admitting: EMERGENCY MEDICINE
Payer: COMMERCIAL

## 2020-07-28 VITALS
BODY MASS INDEX: 33.47 KG/M2 | SYSTOLIC BLOOD PRESSURE: 123 MMHG | TEMPERATURE: 98.6 F | RESPIRATION RATE: 16 BRPM | DIASTOLIC BLOOD PRESSURE: 76 MMHG | OXYGEN SATURATION: 100 % | WEIGHT: 240 LBS

## 2020-07-28 DIAGNOSIS — J06.9 UPPER RESPIRATORY TRACT INFECTION, UNSPECIFIED TYPE: ICD-10-CM

## 2020-07-28 DIAGNOSIS — Z20.822 SUSPECTED COVID-19 VIRUS INFECTION: ICD-10-CM

## 2020-07-28 PROCEDURE — C9803 HOPD COVID-19 SPEC COLLECT: HCPCS

## 2020-07-28 PROCEDURE — 99283 EMERGENCY DEPT VISIT LOW MDM: CPT

## 2020-07-28 PROCEDURE — U0003 INFECTIOUS AGENT DETECTION BY NUCLEIC ACID (DNA OR RNA); SEVERE ACUTE RESPIRATORY SYNDROME CORONAVIRUS 2 (SARS-COV-2) (CORONAVIRUS DISEASE [COVID-19]), AMPLIFIED PROBE TECHNIQUE, MAKING USE OF HIGH THROUGHPUT TECHNOLOGIES AS DESCRIBED BY CMS-2020-01-R: HCPCS | Performed by: FAMILY MEDICINE

## 2020-07-28 PROCEDURE — 99283 EMERGENCY DEPT VISIT LOW MDM: CPT | Mod: Z6 | Performed by: EMERGENCY MEDICINE

## 2020-07-28 RX ORDER — VENLAFAXINE HYDROCHLORIDE 37.5 MG/1
37.5 CAPSULE, EXTENDED RELEASE ORAL
COMMUNITY
Start: 2020-06-18 | End: 2022-05-10

## 2020-07-28 ASSESSMENT — ENCOUNTER SYMPTOMS
DIARRHEA: 0
VOMITING: 0
NAUSEA: 0
FEVER: 0
COUGH: 1
SORE THROAT: 0
ABDOMINAL PAIN: 0
CHILLS: 0
SHORTNESS OF BREATH: 0

## 2020-07-28 NOTE — ED AVS SNAPSHOT
[facility]  [address] [place] [id number]  Phone:  [phone]                                    Zara Ibarra   MRN: [id number]    Department:  [facility]   Date of Visit:  7/28/2020           After Visit Summary Signature Page    I have received my discharge instructions, and my questions have been answered. I have discussed any challenges I see with this plan with the nurse or doctor.    ..........................................................................................................................................  Patient/Patient Representative Signature      ..........................................................................................................................................  Patient Representative Print Name and Relationship to Patient    ..................................................               ................................................  Date                                   Time    ..........................................................................................................................................  Reviewed by Signature/Title    ...................................................              ..............................................  Date                                               Time          22EPIC Rev 08/18

## 2020-07-28 NOTE — ED PROVIDER NOTES
History     Chief Complaint   Patient presents with     Cough     HPI  Zara Ibarra is a 19 year old female who presents with cough and nasal congestion since yesterday.  She denies fever/chills, SOB, chest pain, loss of taste or smell, N/V/D, myalgias, leg pain/swelling.  She does not think she is pregnant, but not sure.    Allergies:  Allergies   Allergen Reactions     No Clinical Screening - See Comments      Red 40 & Cherry dye. Hives and difficulty breathing.      Red Dye      Red 40       Problem List:    Patient Active Problem List    Diagnosis Date Noted     Anemia due to blood loss, acute--not complicated,  due to  Section---3/2/2019 2019     Priority: Medium     Endometritis following delivery, Leukocytosis--3/2/2019 2019     Priority: Medium      delivery delivered--3/2/2019,  PP Endometritis 2019     Priority: Medium     Normal labor 2019     Priority: Medium     Vitamin D deficiency 2015     Priority: Medium     Sleep disorder breathing 2015     Priority: Medium     Dysmenorrhea 2014     Priority: Medium     Suicidal ideation 2014     Priority: Medium     Environmental allergies      Priority: Medium        Past Medical History:    Past Medical History:   Diagnosis Date     Allergic rhinitis      Asthma      Environmental allergies      Reflux      Suicide attempt (H) 3/15     Tonsillar and adenoid hypertrophy        Past Surgical History:    Past Surgical History:   Procedure Laterality Date      SECTION N/A 3/2/2019    Procedure:  SECTION WITH VIABLE BABY BOY BORN @ 0129.;  Surgeon: Frow, David Franke, MD;  Location: [facility]     TONSILLECTOMY, ADENOIDECTOMY, COMBINED Bilateral 2015    Procedure: COMBINED TONSILLECTOMY, ADENOIDECTOMY;  Surgeon: Pinky Anglin MD;  Location: [facility]       Family History:    Family History   Problem Relation Age of Onset     Allergies Mother      Depression Mother        Social  History:  Marital Status:  Single [1]  Social History     Tobacco Use     Smoking status: Former Smoker     Packs/day: 0.50     Years: 2.00     Pack years: 1.00     Smokeless tobacco: Never Used     Tobacco comment: Called pt per provider and pt stated not using.   Substance Use Topics     Alcohol use: No     Alcohol/week: 0.0 standard drinks     Drug use: No     Comment: Called pt and pt stated she is not currently using.         Medications:    ibuprofen (ADVIL/MOTRIN) 800 MG tablet  UNKNOWN TO PATIENT  venlafaxine (EFFEXOR-XR) 37.5 MG 24 hr capsule  docusate sodium (COLACE) 50 MG capsule          Review of Systems   Constitutional: Negative for chills and fever.   HENT: Negative for ear pain and sore throat.    Respiratory: Positive for cough. Negative for shortness of breath.    Cardiovascular: Negative for chest pain.   Gastrointestinal: Negative for abdominal pain, diarrhea, nausea and vomiting.       Physical Exam   BP: 123/76  Heart Rate: 78  Temp: 98.6  F (37  C)  Resp: 16  Weight: 108.9 kg (240 lb)  SpO2: 100 %      Physical Exam  Nursing note and vitals reviewed.  Constitutional:  Appears well-developed and well-nourished.   HENT:   Head:    Atraumatic.   Mouth/Throat:   Oropharynx is clear and moist. No oropharyngeal exudate.   Eyes:    Pupils are equal, round, and reactive to light.   Neck:    Normal range of motion. Neck supple.      No tracheal deviation present. No thyromegaly present.   Cardiovascular:  Normal rate, regular rhythm, no murmur   Pulmonary/Chest: Breath sounds are clear and equal without wheezes or crackles.  Abdominal:   Soft. Bowel sounds are normal. Exhibits no distension and      no mass. There is no tenderness.      There is no rebound and no guarding.   Musculoskeletal:  Exhibits no edema.   Lymphadenopathy:  No cervical adenopathy.   Neurological:   Alert and oriented to person, place, and time.   Skin:    Skin is warm and dry. No rash noted. No pallor.     ED Course      COVID-19 test sent, pending    No results found for this or any previous visit (from the past 24 hour(s)).    Medications - No data to display    Assessments & Plan (with Medical Decision Making)     This patient has URI symptoms with cough, but no respiratory distress.  Lungs are clear.  No concern for pneumonia, Pulmonary Embolism or cardiac problem.  Suspect COVID-19 infection, testing pending.  She was told to isolate herself until COVID-19 testing results return and no fever/chills for 3 days.      Covid-19  Zara Ibarra was evaluated during a global COVID-19 pandemic, which necessitated consideration that the patient might be at risk for infection with the SARS-CoV-2 virus that causes COVID-19.   Applicable protocols for evaluation were followed during the patient's care.   COVID-19 was considered as part of the patient's evaluation. The plan for testing is:  a test was obtained during this visit.      I have reviewed the nursing notes.    I have reviewed the findings, diagnosis, plan and need for follow up with the patient.      New Prescriptions    No medications on file       Final diagnoses:   Upper respiratory tract infection, unspecified type   Suspected COVID-19 virus infection       7/28/2020   [facility]     Clarissa Luque MD  07/28/20 0987

## 2020-07-28 NOTE — ED TRIAGE NOTES
Cough since Saturday. Denies fever or shortness of breath. Requesting COVID testing. Also requesting work note.

## 2020-07-28 NOTE — LETTER
July 28, 2020      To Whom It May Concern:      Zara Ibarra was seen in our Emergency Department today, 07/28/20.  I expect her condition to improve over the next 7 days.  She may return to work/school when improved.  Please excuse her as needed for the next 7 days as needed.  She was tested for COVID-19 and is awaiting test results.  She must also be fever-free for 3 days before returning to work.    Sincerely,        Clarissa Luque MD

## 2020-07-29 LAB
SARS-COV-2 RNA SPEC QL NAA+PROBE: NOT DETECTED
SPECIMEN SOURCE: NORMAL

## 2020-12-20 ENCOUNTER — HEALTH MAINTENANCE LETTER (OUTPATIENT)
Age: 20
End: 2020-12-20

## 2021-04-07 ENCOUNTER — HOSPITAL ENCOUNTER (EMERGENCY)
Facility: HOSPITAL | Age: 21
Discharge: HOME | End: 2021-04-07
Attending: INTERNAL MEDICINE | Admitting: INTERNAL MEDICINE
Payer: COMMERCIAL

## 2021-04-07 ENCOUNTER — APPOINTMENT (OUTPATIENT)
Dept: GENERAL RADIOLOGY | Facility: HOSPITAL | Age: 21
End: 2021-04-07
Attending: INTERNAL MEDICINE
Payer: COMMERCIAL

## 2021-04-07 VITALS
TEMPERATURE: 98.8 F | RESPIRATION RATE: 16 BRPM | DIASTOLIC BLOOD PRESSURE: 84 MMHG | HEART RATE: 85 BPM | SYSTOLIC BLOOD PRESSURE: 131 MMHG | OXYGEN SATURATION: 98 %

## 2021-04-07 DIAGNOSIS — U07.1 2019 NOVEL CORONAVIRUS DISEASE (COVID-19): ICD-10-CM

## 2021-04-07 LAB
FLUAV RNA RESP QL NAA+PROBE: NEGATIVE
FLUBV RNA RESP QL NAA+PROBE: NEGATIVE
LABORATORY COMMENT REPORT: ABNORMAL
RSV RNA SPEC QL NAA+PROBE: NEGATIVE
SARS-COV-2 RNA RESP QL NAA+PROBE: POSITIVE
SPECIMEN SOURCE: ABNORMAL

## 2021-04-07 PROCEDURE — 71046 X-RAY EXAM CHEST 2 VIEWS: CPT

## 2021-04-07 PROCEDURE — C9803 HOPD COVID-19 SPEC COLLECT: HCPCS

## 2021-04-07 PROCEDURE — 87636 SARSCOV2 & INF A&B AMP PRB: CPT | Performed by: INTERNAL MEDICINE

## 2021-04-07 PROCEDURE — 99284 EMERGENCY DEPT VISIT MOD MDM: CPT | Performed by: INTERNAL MEDICINE

## 2021-04-07 PROCEDURE — 99284 EMERGENCY DEPT VISIT MOD MDM: CPT | Mod: 25

## 2021-04-07 PROCEDURE — 250N000013 HC RX MED GY IP 250 OP 250 PS 637: Performed by: INTERNAL MEDICINE

## 2021-04-07 RX ORDER — NAPROXEN 500 MG/1
500 TABLET ORAL ONCE
Status: COMPLETED | OUTPATIENT
Start: 2021-04-07 | End: 2021-04-07

## 2021-04-07 RX ADMIN — NAPROXEN 500 MG: 500 TABLET ORAL at 01:30

## 2021-04-07 NOTE — DISCHARGE INSTRUCTIONS
"Instructions for Patients ([facility] Specific)  Sign Up for GetWell Loop  COVID-19 Symptoms  We know it's scary to hear you might have COVID-19. We want to track your symptoms to make sure you're OK over the next 2 weeks.    Please look for an email from [employer]. This is a free, online program that we'll use to keep in touch. To sign up, click the link in the email you get.    If you don't get an email, please call your [facility] clinic. Ask them to sign you up for GetWell Loop.    You can learn more at [email or web address].  For informational purposes only. Not to replace the advice of your health care provider.   Copyright   2020 [facility]. Clinically reviewed by Maritza Vang. All rights reserved. [place] [id number] - 04/20.      How can I protect others?  If you have symptoms (fever, cough, body aches or trouble breathing): Stay home and away from others (self-isolate) until:    At least 10 days have passed since your symptoms started, And     You ve had no fever--and no medicine that reduces fever--for 1 full day (24 hours), And      Your other symptoms have resolved (gotten better).     If you don t have symptoms, but a test showed that you have COVID-19 (you tested positive):    Stay home and away from others (self-isolate). Follow the tips under \"How do I self-isolate?\" below for 10 days (20 days if you have a weak immune system).    You don't need to be retested for COVID-19 before going back to school or work. As long as you're fever-free and feeling better, you can go back to school, work and other activities after waiting the 10 or 20 days.     How do I self-isolate?    Stay in your own room, even for meals. Use your own bathroom if you can.     Stay away from others in your home. No hugging, kissing or shaking hands. No visitors.    Don t go to work, school or anywhere else.     Clean  high touch  surfaces often (doorknobs, counters, handles, etc.). Use a " household cleaning spray or wipes. You ll find a full list on the EPA website:  www.epa.gov/pesticide-registration/list-n-disinfectants-use-against-sars-cov-2.    Cover your mouth and nose with a mask, tissue or washcloth to avoid spreading germs.    Wash your hands and face often. Use soap and water.    Caregivers in these groups are at risk for severe illness due to COVID-19:  o People 65 years and older  o People who live in a nursing home or long-term care facility  o People with chronic disease (lung, heart, cancer, diabetes, kidney, liver, immunologic)  o People who have a weakened immune system, including those who:  - Are in cancer treatment  - Take medicine that weakens the immune system, such as corticosteroids  - Had a bone marrow or organ transplant  - Have an immune deficiency  - Have poorly controlled HIV or AIDS  - Are obese (body mass index of 40 or higher)  - Smoke regularly    Caregivers should wear gloves while washing dishes, handling laundry and cleaning bedrooms and bathrooms.    Use caution when washing and drying laundry: Don t shake dirty laundry, and use the warmest water setting that you can.    For more tips, go to www.cdc.gov/coronavirus/2019-ncov/downloads/10Things.pdf.    How can I take care of myself?  1. Get lots of rest. Drink extra fluids (unless a doctor has told you not to).     2. Take Tylenol (acetaminophen) for fever or pain. If you have liver or kidney problems, ask your family doctor if it s okay to take Tylenol.     Adults can take either:     650 mg (two 325 mg pills) every 4 to 6 hours, or     1,000 mg (two 500 mg pills) every 8 hours as needed.     Note: Don t take more than 3,000 mg in one day.   Acetaminophen is found in many medicines (both prescribed and over-the-counter medicines). Read all labels to be sure you don t take too much.     For children, check the Tylenol bottle for the right dose. The dose is based on the child s age or weight.    3. If you have other  health problems (like cancer, heart failure, an organ transplant or severe kidney disease): Call your specialty clinic if you don t feel better in the next 2 days.    4. Know when to call 911: Emergency warning signs include:    Trouble breathing or shortness of breath    Pain or pressure in the chest that doesn t go away    Feeling confused like you haven t felt before, or not being able to wake up    Bluish-colored lips or face    What are the symptoms of COVID-19?     The most common symptoms are cough, fever and trouble breathing.     Less common symptoms include body aches, chills, diarrhea (loose, watery poops), fatigue (feeling very tired), headache, runny nose, sore throat and loss of smell.    COVID-19 can cause severe coughing (bronchitis) and lung infection (pneumonia).    How does it spread?     The virus may spread when a person coughs or sneezes into the air. The virus can travel about 6 feet this way, and it can live on surfaces.      Common  (household disinfectants) will kill the virus.    Who is at risk?  Anyone can catch COVID-19 if they re around someone who has the virus.    How can others protect themselves?     Stay away from people who have COVID-19 (or symptoms of COVID-19).    Wash hands often with soap and water. Or, use hand  with at least 60% alcohol.    Avoid touching the eyes, nose or mouth.     Wear a face mask when you go out in public, when sick or when caring for a sick person.    Where can I get more information?    [facility]: About COVID-19: [email or web address]    CDC: What to Do If You re Sick: www.cdc.gov/coronavirus/2019-ncov/about/steps-when-sick.html    CDC: Ending Home Isolation: www.cdc.gov/coronavirus/2019-ncov/hcp/disposition-in-home-patients.html     CDC: Caring for Someone: www.cdc.gov/coronavirus/2019-ncov/if-you-are-sick/care-for-someone.html     MD: Interim Guidance for Hospital Discharge to Home:  www.health.[facility].[place]/diseases/coronavirus/hcp/hospdischarge.pdf    [facility] clinical trials (COVID-19 research studies): clinicalaffairs.[facility]-clinical-trials     Below are the COVID-19 hotlines at the [place] Department [facility] ([facility]). Interpreters are available.   o For health questions: Call 509-867-5252 or 1-779.681.5751 (7 a.m. to 7 p.m.)  o For questions about schools and childcare: Call 787-699-0891 or 1-419.608.3946 (7 a.m. to 7 p.m.)          Thank you for taking steps to prevent the spread of this virus.  o Limit your contact with others.  o Wear a simple mask to cover your cough.  o Wash your hands well and often.  o If you need medical care, go to [email or web address] or contact your health care provider.     For more about COVID-19 and caring for yourself at home, visit the CDC website at https://www.cdc.gov/coronavirus/2019-ncov/about/steps-when-sick.html.     To learn about care at [facility], please go to [email or web address].     [facility] clinical trials (COVID-19 research studies): clinicalaffairs.[facility]/n-clinical-trials.    Below are the COVID-19 hotlines at the [place] [facility] ([facility]). Interpreters are available.     For health questions: Call 983-852-8658 or 1-659.450.8989 (7 a.m. to 7 p.m.)    For questions about schools and childcare: Call 648-351-0096 or 1-416.482.6370 (7 a.m. to 7 p.m.)

## 2021-04-07 NOTE — ED NOTES
Patient given written and verbal discharge instructions and patient verbalizes understanding. Patient provided education regarding COVID diagnosis, quarantining and symptom monitoring. Patient also provided with home oximeter.

## 2021-04-07 NOTE — ED NOTES
Patient to ED room 11. Patient states she has had generalized body aches and fever since yesterday. Patient states last OTC fever reducers were yesterday morning and she didn't have anymore at home. Patient states that temp has been up to 100.6 at home. No known COVID exposures, has not been vaccinated.

## 2021-04-07 NOTE — ED NOTES
DATE:  4/7/2021   TIME OF RECEIPT FROM LAB:  0220  LAB TEST:  COVID  LAB VALUE:  +Positive  RESULTS GIVEN WITH READ-BACK TO (PROVIDER):  Dr. Rodriguez  TIME LAB VALUE REPORTED TO PROVIDER:   0220

## 2021-04-07 NOTE — Clinical Note
Zara Ibarra was seen and treated in our emergency department on 4/7/2021.  She may return to work on 04/16/2021.       If you have any questions or concerns, please don't hesitate to call.      Jef Rodriguez MD

## 2021-04-12 ASSESSMENT — ENCOUNTER SYMPTOMS
ARTHRALGIAS: 0
VOMITING: 0
FATIGUE: 1
CONFUSION: 0
HEMATURIA: 0
DIAPHORESIS: 0
NECK PAIN: 0
ANAL BLEEDING: 0
NUMBNESS: 0
SHORTNESS OF BREATH: 0
FLANK PAIN: 0
CHEST TIGHTNESS: 0
HEADACHES: 1
WHEEZING: 0
FEVER: 1
WOUND: 0
MYALGIAS: 1
COUGH: 0
NAUSEA: 0
ABDOMINAL PAIN: 0
LIGHT-HEADEDNESS: 0
CHILLS: 0
DYSURIA: 0
NECK STIFFNESS: 0
BACK PAIN: 0
BLOOD IN STOOL: 0
ABDOMINAL DISTENTION: 0
COLOR CHANGE: 0
DIZZINESS: 0
VOICE CHANGE: 0
PALPITATIONS: 0

## 2021-04-13 NOTE — ED PROVIDER NOTES
History     Chief Complaint   Patient presents with     Fever     x1 day, up to 100.6 at home     Generalized Body Aches     The history is provided by the patient.   Fever  Temp source:  Oral  Severity:  Mild  Onset quality:  Gradual  Duration:  1 day  Timing:  Constant  Progression:  Worsening  Chronicity:  New  Associated symptoms: headaches and myalgias    Associated symptoms: no chest pain, no chills, no confusion, no cough, no dysuria, no nausea, no rash and no vomiting          Allergies:  Allergies   Allergen Reactions     No Clinical Screening - See Comments      Red 40 & Cherry dye. Hives and difficulty breathing.      Food Hives     cherries     Red Dye      Red 40       Problem List:    Patient Active Problem List    Diagnosis Date Noted     Anemia due to blood loss, acute--not complicated,  due to  Section---3/2/2019 2019     Priority: Medium     Endometritis following delivery, Leukocytosis--3/2/2019 2019     Priority: Medium      delivery delivered--3/2/2019,  PP Endometritis 2019     Priority: Medium     Normal labor 2019     Priority: Medium     Vitamin D deficiency 2015     Priority: Medium     Sleep disorder breathing 2015     Priority: Medium     Dysmenorrhea 2014     Priority: Medium     Suicidal ideation 2014     Priority: Medium     Environmental allergies      Priority: Medium        Past Medical History:    Past Medical History:   Diagnosis Date     Allergic rhinitis      Asthma      Environmental allergies      Reflux      Suicide attempt (H) 3/15     Tonsillar and adenoid hypertrophy        Past Surgical History:    Past Surgical History:   Procedure Laterality Date      SECTION N/A 3/2/2019    Procedure:  SECTION WITH VIABLE BABY BOY BORN @ 0129.;  Surgeon: Frow, David Franke, MD;  Location: [facility]     TONSILLECTOMY, ADENOIDECTOMY, COMBINED Bilateral 2015    Procedure: COMBINED TONSILLECTOMY,  ADENOIDECTOMY;  Surgeon: Pinky Anglin MD;  Location: [facility]       Family History:    Family History   Problem Relation Age of Onset     Allergies Mother      Depression Mother        Social History:  Marital Status:  Single [1]  Social History     Tobacco Use     Smoking status: Current Every Day Smoker     Packs/day: 0.50     Years: 2.00     Pack years: 1.00     Smokeless tobacco: Never Used     Tobacco comment: Called pt per provider and pt stated not using.   Substance Use Topics     Alcohol use: No     Alcohol/week: 0.0 standard drinks     Drug use: No     Comment: Called pt and pt stated she is not currently using.         Medications:    UNKNOWN TO PATIENT  venlafaxine (EFFEXOR-XR) 37.5 MG 24 hr capsule          Review of Systems   Constitutional: Positive for fatigue and fever. Negative for chills and diaphoresis.   HENT: Negative for voice change.    Eyes: Negative for visual disturbance.   Respiratory: Negative for cough, chest tightness, shortness of breath and wheezing.    Cardiovascular: Negative for chest pain, palpitations and leg swelling.   Gastrointestinal: Negative for abdominal distention, abdominal pain, anal bleeding, blood in stool, nausea and vomiting.   Genitourinary: Negative for decreased urine volume, dysuria, flank pain and hematuria.   Musculoskeletal: Positive for myalgias. Negative for arthralgias, back pain, gait problem, neck pain and neck stiffness.   Skin: Negative for color change, pallor, rash and wound.   Neurological: Positive for headaches. Negative for dizziness, syncope, light-headedness and numbness.   Psychiatric/Behavioral: Negative for confusion and suicidal ideas.       Physical Exam   BP: 131/84  Pulse: 85  Temp: 98.8  F (37.1  C)  Resp: 18  SpO2: 98 %      Physical Exam  Vitals signs and nursing note reviewed.   Constitutional:       Appearance: She is well-developed.   HENT:      Head: Normocephalic and atraumatic.      Mouth/Throat:      Pharynx: No  oropharyngeal exudate.   Eyes:      Conjunctiva/sclera: Conjunctivae normal.      Pupils: Pupils are equal, round, and reactive to light.   Neck:      Musculoskeletal: Normal range of motion and neck supple.      Thyroid: No thyromegaly.      Vascular: No JVD.      Trachea: No tracheal deviation.   Cardiovascular:      Rate and Rhythm: Normal rate and regular rhythm.      Heart sounds: Normal heart sounds. No murmur. No friction rub. No gallop.    Pulmonary:      Effort: Pulmonary effort is normal. No respiratory distress.      Breath sounds: Normal breath sounds. No stridor. No wheezing or rales.   Chest:      Chest wall: No tenderness.   Abdominal:      General: Bowel sounds are normal. There is no distension.      Palpations: Abdomen is soft. There is no mass.      Tenderness: There is no abdominal tenderness. There is no guarding or rebound.   Musculoskeletal: Normal range of motion.         General: No tenderness.   Lymphadenopathy:      Cervical: No cervical adenopathy.   Skin:     General: Skin is warm and dry.      Coloration: Skin is not pale.      Findings: No erythema or rash.   Neurological:      Mental Status: She is alert and oriented to person, place, and time.   Psychiatric:         Behavior: Behavior normal.         ED Course        Procedures           No results found for this or any previous visit (from the past 24 hour(s)).    Medications   naproxen (NAPROSYN) tablet 500 mg (500 mg Oral Given 4/7/21 0130)       Assessments & Plan (with Medical Decision Making)   Flue like symptoms for 1 day  Improved after receiving naproxen  CXR: negative  COVID test positive  [facility] loop explained to patient , I advised oral hydration at home, if felt SOB, very weak or any unusual symptoms return to ER  She understood and agreed.   I have reviewed the nursing notes.    I have reviewed the findings, diagnosis, plan and need for follow up with the patient.      Discharge Medication List as of 4/7/2021  2:43 AM           Final diagnoses:   2019 novel coronavirus disease (COVID-19)       4/7/2021   [facility]     Jef Rodriguez MD  04/12/21 1942

## 2021-04-26 ENCOUNTER — TELEPHONE (OUTPATIENT)
Dept: FAMILY MEDICINE | Facility: OTHER | Age: 21
End: 2021-04-26

## 2021-04-26 ENCOUNTER — OFFICE VISIT (OUTPATIENT)
Dept: FAMILY MEDICINE | Facility: OTHER | Age: 21
End: 2021-04-26
Payer: COMMERCIAL

## 2021-04-26 DIAGNOSIS — Z20.822 COVID-19 RULED OUT: Primary | ICD-10-CM

## 2021-04-26 PROCEDURE — U0005 INFEC AGEN DETEC AMPLI PROBE: HCPCS | Mod: ZL | Performed by: FAMILY MEDICINE

## 2021-04-26 PROCEDURE — U0003 INFECTIOUS AGENT DETECTION BY NUCLEIC ACID (DNA OR RNA); SEVERE ACUTE RESPIRATORY SYNDROME CORONAVIRUS 2 (SARS-COV-2) (CORONAVIRUS DISEASE [COVID-19]), AMPLIFIED PROBE TECHNIQUE, MAKING USE OF HIGH THROUGHPUT TECHNOLOGIES AS DESCRIBED BY CMS-2020-01-R: HCPCS | Mod: ZL | Performed by: FAMILY MEDICINE

## 2021-04-27 ENCOUNTER — TELEPHONE (OUTPATIENT)
Dept: FAMILY MEDICINE | Facility: OTHER | Age: 21
End: 2021-04-27

## 2021-04-27 LAB
LABORATORY COMMENT REPORT: ABNORMAL
SARS-COV-2 RNA RESP QL NAA+PROBE: POSITIVE
SPECIMEN SOURCE: ABNORMAL

## 2021-04-27 NOTE — TELEPHONE ENCOUNTER
Coronavirus (COVID-19) Notification    Reason for call  Notify of POSITIVE  COVID-19 lab result, assess symptoms,  review [facility] recommendations    Lab Result   Lab test for 2019-nCoV rRt-PCR or SARS-COV-2 PCR  Oropharyngeal AND/OR nasopharyngeal swabs were POSITIVE for 2019-nCoV RNA [OR] SARS-COV-2 RNA (COVID-19) RNA     We have been unable to reach Patient by phone at this time to notify of their Positive COVID-19 result.  Left voicemail message requesting a call back to [phone] [facility] for results.        POSITIVE COVID-19 Letter sent.    Grazyna Rebolledo LPN

## 2021-06-15 ENCOUNTER — HOSPITAL ENCOUNTER (EMERGENCY)
Facility: HOSPITAL | Age: 21
Discharge: LEFT BEFORE DISCHARGE | End: 2021-06-15
Admitting: EMERGENCY MEDICINE
Payer: COMMERCIAL

## 2021-06-15 VITALS
HEART RATE: 92 BPM | DIASTOLIC BLOOD PRESSURE: 75 MMHG | SYSTOLIC BLOOD PRESSURE: 142 MMHG | RESPIRATION RATE: 16 BRPM | TEMPERATURE: 99 F | OXYGEN SATURATION: 96 %

## 2021-06-15 PROCEDURE — 999N000104 HC STATISTIC NO CHARGE

## 2021-06-15 RX ORDER — IBUPROFEN 800 MG/1
800 TABLET, FILM COATED ORAL EVERY 8 HOURS PRN
COMMUNITY
End: 2022-05-10

## 2021-10-03 ENCOUNTER — HEALTH MAINTENANCE LETTER (OUTPATIENT)
Age: 21
End: 2021-10-03

## 2022-01-22 ENCOUNTER — HEALTH MAINTENANCE LETTER (OUTPATIENT)
Age: 22
End: 2022-01-22

## 2022-05-10 ENCOUNTER — HOSPITAL ENCOUNTER (EMERGENCY)
Facility: HOSPITAL | Age: 22
Discharge: HOME | End: 2022-05-10
Attending: EMERGENCY MEDICINE | Admitting: EMERGENCY MEDICINE
Payer: COMMERCIAL

## 2022-05-10 VITALS
RESPIRATION RATE: 16 BRPM | TEMPERATURE: 98.8 F | HEART RATE: 74 BPM | SYSTOLIC BLOOD PRESSURE: 126 MMHG | DIASTOLIC BLOOD PRESSURE: 80 MMHG | OXYGEN SATURATION: 98 %

## 2022-05-10 DIAGNOSIS — J06.9 VIRAL URI WITH COUGH: ICD-10-CM

## 2022-05-10 DIAGNOSIS — Z20.822 PERSON UNDER INVESTIGATION FOR COVID-19: ICD-10-CM

## 2022-05-10 DIAGNOSIS — K52.9 GASTROENTERITIS: ICD-10-CM

## 2022-05-10 LAB
ALBUMIN SERPL-MCNC: 3.3 G/DL (ref 3.4–5)
ALP SERPL-CCNC: 77 U/L (ref 40–150)
ALT SERPL W P-5'-P-CCNC: 19 U/L (ref 0–50)
ANION GAP SERPL CALCULATED.3IONS-SCNC: 7 MMOL/L (ref 3–14)
AST SERPL W P-5'-P-CCNC: 20 U/L (ref 0–45)
B-HCG SERPL-ACNC: <1 IU/L (ref 0–5)
BASOPHILS # BLD AUTO: 0 10E3/UL (ref 0–0.2)
BASOPHILS NFR BLD AUTO: 0 %
BILIRUB SERPL-MCNC: 0.4 MG/DL (ref 0.2–1.3)
BUN SERPL-MCNC: 5 MG/DL (ref 7–30)
CALCIUM SERPL-MCNC: 8.5 MG/DL (ref 8.5–10.1)
CHLORIDE BLD-SCNC: 105 MMOL/L (ref 94–109)
CO2 SERPL-SCNC: 23 MMOL/L (ref 20–32)
CREAT SERPL-MCNC: 0.5 MG/DL (ref 0.52–1.04)
EOSINOPHIL # BLD AUTO: 0 10E3/UL (ref 0–0.7)
EOSINOPHIL NFR BLD AUTO: 0 %
ERYTHROCYTE [DISTWIDTH] IN BLOOD BY AUTOMATED COUNT: 13.2 % (ref 10–15)
FLUAV RNA SPEC QL NAA+PROBE: POSITIVE
FLUBV RNA RESP QL NAA+PROBE: NEGATIVE
GFR SERPL CREATININE-BSD FRML MDRD: >90 ML/MIN/1.73M2
GLUCOSE BLD-MCNC: 112 MG/DL (ref 70–99)
HCT VFR BLD AUTO: 42.4 % (ref 35–47)
HGB BLD-MCNC: 14.1 G/DL (ref 11.7–15.7)
HOLD SPECIMEN: NORMAL
IMM GRANULOCYTES # BLD: 0 10E3/UL
IMM GRANULOCYTES NFR BLD: 0 %
LYMPHOCYTES # BLD AUTO: 1.4 10E3/UL (ref 0.8–5.3)
LYMPHOCYTES NFR BLD AUTO: 21 %
MCH RBC QN AUTO: 27.6 PG (ref 26.5–33)
MCHC RBC AUTO-ENTMCNC: 33.3 G/DL (ref 31.5–36.5)
MCV RBC AUTO: 83 FL (ref 78–100)
MONOCYTES # BLD AUTO: 0.3 10E3/UL (ref 0–1.3)
MONOCYTES NFR BLD AUTO: 5 %
NEUTROPHILS # BLD AUTO: 5 10E3/UL (ref 1.6–8.3)
NEUTROPHILS NFR BLD AUTO: 74 %
NRBC # BLD AUTO: 0 10E3/UL
NRBC BLD AUTO-RTO: 0 /100
PLATELET # BLD AUTO: 248 10E3/UL (ref 150–450)
POTASSIUM BLD-SCNC: 3.1 MMOL/L (ref 3.4–5.3)
PROT SERPL-MCNC: 7.5 G/DL (ref 6.8–8.8)
RBC # BLD AUTO: 5.1 10E6/UL (ref 3.8–5.2)
RSV RNA SPEC NAA+PROBE: NEGATIVE
SARS-COV-2 RNA RESP QL NAA+PROBE: NEGATIVE
SODIUM SERPL-SCNC: 135 MMOL/L (ref 133–144)
WBC # BLD AUTO: 6.7 10E3/UL (ref 4–11)

## 2022-05-10 PROCEDURE — 96361 HYDRATE IV INFUSION ADD-ON: CPT

## 2022-05-10 PROCEDURE — 84702 CHORIONIC GONADOTROPIN TEST: CPT | Performed by: EMERGENCY MEDICINE

## 2022-05-10 PROCEDURE — C9803 HOPD COVID-19 SPEC COLLECT: HCPCS

## 2022-05-10 PROCEDURE — 99284 EMERGENCY DEPT VISIT MOD MDM: CPT | Performed by: EMERGENCY MEDICINE

## 2022-05-10 PROCEDURE — 258N000003 HC RX IP 258 OP 636: Performed by: EMERGENCY MEDICINE

## 2022-05-10 PROCEDURE — 80053 COMPREHEN METABOLIC PANEL: CPT | Performed by: EMERGENCY MEDICINE

## 2022-05-10 PROCEDURE — 99284 EMERGENCY DEPT VISIT MOD MDM: CPT | Mod: 25

## 2022-05-10 PROCEDURE — 250N000011 HC RX IP 250 OP 636: Performed by: EMERGENCY MEDICINE

## 2022-05-10 PROCEDURE — 96374 THER/PROPH/DIAG INJ IV PUSH: CPT

## 2022-05-10 PROCEDURE — 87637 SARSCOV2&INF A&B&RSV AMP PRB: CPT | Performed by: EMERGENCY MEDICINE

## 2022-05-10 PROCEDURE — 85025 COMPLETE CBC W/AUTO DIFF WBC: CPT | Performed by: EMERGENCY MEDICINE

## 2022-05-10 PROCEDURE — 36415 COLL VENOUS BLD VENIPUNCTURE: CPT | Performed by: EMERGENCY MEDICINE

## 2022-05-10 RX ORDER — ONDANSETRON 4 MG/1
4 TABLET, ORALLY DISINTEGRATING ORAL EVERY 6 HOURS PRN
Qty: 20 TABLET | Refills: 0 | Status: SHIPPED | OUTPATIENT
Start: 2022-05-10

## 2022-05-10 RX ORDER — POTASSIUM CHLORIDE 1500 MG/1
20 TABLET, EXTENDED RELEASE ORAL 2 TIMES DAILY
Qty: 10 TABLET | Refills: 0 | Status: SHIPPED | OUTPATIENT
Start: 2022-05-10 | End: 2022-05-15

## 2022-05-10 RX ORDER — LEVONORGESTREL AND ETHINYL ESTRADIOL 0.15-0.03
KIT ORAL
COMMUNITY
Start: 2022-01-18

## 2022-05-10 RX ORDER — ONDANSETRON 2 MG/ML
4 INJECTION INTRAMUSCULAR; INTRAVENOUS ONCE
Status: COMPLETED | OUTPATIENT
Start: 2022-05-10 | End: 2022-05-10

## 2022-05-10 RX ADMIN — SODIUM CHLORIDE 1000 ML: 9 INJECTION, SOLUTION INTRAVENOUS at 08:55

## 2022-05-10 RX ADMIN — ONDANSETRON 4 MG: 2 INJECTION INTRAMUSCULAR; INTRAVENOUS at 09:12

## 2022-05-10 NOTE — DISCHARGE INSTRUCTIONS
You were seen today in the emergency department at [facility] for nausea vomiting diarrhea cough and congestion.  Your evaluation here including blood counts and lab evaluations look stable and reassuring.  We are reassured by your oxygen levels and exam.  We think your symptoms are related to a systemic viral infection like gastroenteritis.  We would like you to make sure you are drinking plenty of liquids at home.  You can take Zofran as needed for nausea.  Your potassium levels were slightly low here so we are going to prescribe you a few days of replacement for this.  Please plan to follow-up any ongoing symptoms before the end of the week and primary care clinic.  If you have any other immediate concern like severe shortness of breath or severe abdominal pain we can reevaluate you in the emergency department as needed.

## 2022-05-10 NOTE — ED PROVIDER NOTES
EMERGENCY DEPARTMENT ENCOUNTER      NAME: Zara Ibarra  AGE: 21 year old female  YOB: 2000  MRN: [id number]  EVALUATION DATE & TIME: 5/10/2022  8:54 AM    PCP: No Ref-Primary, Physician    ED PROVIDER: Pool Ernst M.D.      Chief Complaint   Patient presents with     Abdominal Pain     N/V/D       FINAL IMPRESSION:  1. Gastroenteritis    2. Viral URI with cough    3. Person under investigation for COVID-19        ED COURSE & MEDICAL DECISION MAKING:    Zara Ibarra is a 21 year old female with no significant PMH who presents to this ED today for evaluation of nausea vomiting diarrhea congestion and fever.  She is vitally stable when she arrives to the emergency department.  Cardiopulmonary exam is unremarkable and abdominal exam is to entirely nontender.  She has a few days of a constellation of symptoms which seem suggestive of a viral upper respiratory illness with this potentially some gastroenteritis component as well.  Her labs are notable for mild hypokalemia which will be replaced.  She received some IV fluids and Zofran here and was resting comfortably on recheck.  Do not suspect any serious bacterial illness requiring antibiotics or further lab or imaging evaluations today.  She will be tested for COVID-19 and influenza and will stay isolated at home until these test result.  She was reassured by her evaluation and discharged in good condition with recommended primary follow-up.        MEDICATIONS GIVEN IN THE EMERGENCY:  Medications   0.9% sodium chloride BOLUS (0 mLs Intravenous Stopped 5/10/22 0940)   ondansetron (ZOFRAN) injection 4 mg (4 mg Intravenous Given 5/10/22 0912)       NEW PRESCRIPTIONS STARTED AT TODAY'S ER VISIT  New Prescriptions    ONDANSETRON (ZOFRAN ODT) 4 MG ODT TAB    Take 1 tablet (4 mg) by mouth every 6 hours as needed for nausea or vomiting    POTASSIUM CHLORIDE ER (K-TAB) 20 MEQ CR TABLET    Take 1 tablet (20 mEq) by mouth 2 times daily for 5 days           =================================================================    [facility]    Patient information was obtained from: Patient      Zara Ibarra is a 21 year old female with no significant PMH who presents to this ED today for evaluation of nausea vomiting diarrhea congestion and fever.  She has had symptoms of intermittent abdominal cramping with vomiting and diarrhea for the last 5 days.  At least 5 episodes each.  No blood in her vomit or her stool.  She also reports some cough and nasal congestion.  She has had on and off fevers and chills for the last few days as well.  She took a home COVID test 2 days ago which was negative.  She denies any shortness of breath.  Denies any current abdominal pain.   She has been taking over-the-counter  flu and cold medicine as well as over-the-counter diarrhea medication.      REVIEW OF SYSTEMS   All systems reviewed and negative except as noted in HPI.    PAST MEDICAL HISTORY:  Past Medical History:   Diagnosis Date     Allergic rhinitis      Asthma     triggered by URIs, does not use any inhalers or treatments.     Environmental allergies      Reflux     takes omeprazole for past year      Suicide attempt (H) 3/15     Tonsillar and adenoid hypertrophy        PAST SURGICAL HISTORY:  Past Surgical History:   Procedure Laterality Date      SECTION N/A 3/2/2019    Procedure:  SECTION WITH VIABLE BABY BOY BORN @ 0129.;  Surgeon: Frow, David Franke, MD;  Location: [facility]     TONSILLECTOMY, ADENOIDECTOMY, COMBINED Bilateral 2015    Procedure: COMBINED TONSILLECTOMY, ADENOIDECTOMY;  Surgeon: Pinky Anglin MD;  Location: [facility]           CURRENT MEDICATIONS:    No current facility-administered medications for this encounter.     Current Outpatient Medications   Medication     levonorgestrel-ethinyl estradiol (NORDETTE) 0.15-30 MG-MCG tablet     ondansetron (ZOFRAN ODT) 4 MG ODT tab     potassium chloride ER (K-TAB) 20 MEQ CR tablet          ALLERGIES:  Allergies   Allergen Reactions     No Clinical Screening - See Comments      Red 40 & Cherry dye. Hives and difficulty breathing.      Food Hives     cherries     Red Dye      Red 40       FAMILY HISTORY:  Family History   Problem Relation Age of Onset     Allergies Mother      Depression Mother        SOCIAL HISTORY:   Social History     Socioeconomic History     Marital status: Single   Tobacco Use     Smoking status: Current Every Day Smoker     Packs/day: 0.50     Years: 2.00     Pack years: 1.00     Smokeless tobacco: Never Used     Tobacco comment: Called pt per provider and pt stated not using.   Substance and Sexual Activity     Alcohol use: No     Alcohol/week: 0.0 standard drinks     Drug use: No     Comment: Called pt and pt stated she is not currently using.      Sexual activity: Yes     Partners: Male     Birth control/protection: Injection, Pill   Other Topics Concern     Parent/sibling w/ CABG, MI or angioplasty before 65F 55M? No   Social History Narrative    Lives at home with mom and little sister.    Attends [employer], 10th grader.       VITALS:  /81   Pulse 91   Temp 99  F (37.2  C) (Tympanic)   Resp 16   SpO2 97%     PHYSICAL EXAM    Constitutional: Well developed, Well nourished, NAD.  HENT: Normocephalic, Atraumatic. Neck Supple.  Eyes: EOMI, Conjunctiva normal.  Respiratory: Breathing comfortably on room air. Speaks full sentences easily. Lungs clear to ascultation.  Cardiovascular: Normal heart rate, Regular rhythm. No peripheral edema.  Abdomen: Soft, nontender  Musculoskeletal: Good range of motion in all major joints. No major deformities noted.  Integument: Warm, Dry.  Neurologic: Alert & awake, Normal motor function, Normal sensory function, No focal deficits noted.   Psychiatric: Cooperative. Affect appropriate.     LAB:  All pertinent labs reviewed and interpreted.  Labs Ordered and Resulted from Time of ED Arrival to Time of ED Departure    COMPREHENSIVE METABOLIC PANEL - Abnormal       Result Value    Sodium 135      Potassium 3.1 (*)     Chloride 105      Carbon Dioxide (CO2) 23      Anion Gap 7      Urea Nitrogen 5 (*)     Creatinine 0.50 (*)     Calcium 8.5      Glucose 112 (*)     Alkaline Phosphatase 77      AST 20      ALT 19      Protein Total 7.5      Albumin 3.3 (*)     Bilirubin Total 0.4      GFR Estimate >90     HCG QUANTITATIVE PREGNANCY - Normal    hCG Quantitative <1     CBC WITH PLATELETS AND DIFFERENTIAL    WBC Count 6.7      RBC Count 5.10      Hemoglobin 14.1      Hematocrit 42.4      MCV 83      MCH 27.6      MCHC 33.3      RDW 13.2      Platelet Count 248      % Neutrophils 74      % Lymphocytes 21      % Monocytes 5      % Eosinophils 0      % Basophils 0      % Immature Granulocytes 0      NRBCs per 100 WBC 0      Absolute Neutrophils 5.0      Absolute Lymphocytes 1.4      Absolute Monocytes 0.3      Absolute Eosinophils 0.0      Absolute Basophils 0.0      Absolute Immature Granulocytes 0.0      Absolute NRBCs 0.0     INFLUENZA A/B & SARS-COV2 PCR MULTIPLEX         Pool Ernst M.D.  Emergency Medicine  [facility]  [address] [place] [id number] [phone]  Dept: [phone]       Pool Ernst MD  05/10/22 0905

## 2022-07-31 ENCOUNTER — HOSPITAL ENCOUNTER (EMERGENCY)
Facility: HOSPITAL | Age: 22
Discharge: HOME | End: 2022-07-31
Attending: PHYSICIAN ASSISTANT | Admitting: PHYSICIAN ASSISTANT
Payer: COMMERCIAL

## 2022-07-31 VITALS
RESPIRATION RATE: 16 BRPM | HEART RATE: 75 BPM | OXYGEN SATURATION: 96 % | SYSTOLIC BLOOD PRESSURE: 104 MMHG | TEMPERATURE: 97.7 F | DIASTOLIC BLOOD PRESSURE: 63 MMHG

## 2022-07-31 DIAGNOSIS — Z11.3 SCREEN FOR STD (SEXUALLY TRANSMITTED DISEASE): ICD-10-CM

## 2022-07-31 LAB
ALBUMIN UR-MCNC: 20 MG/DL
APPEARANCE UR: ABNORMAL
BILIRUB UR QL STRIP: NEGATIVE
C TRACH DNA SPEC QL PROBE+SIG AMP: NEGATIVE
CLUE CELLS: PRESENT
COLOR UR AUTO: YELLOW
GLUCOSE UR STRIP-MCNC: NEGATIVE MG/DL
HGB UR QL STRIP: NEGATIVE
KETONES UR STRIP-MCNC: NEGATIVE MG/DL
LEUKOCYTE ESTERASE UR QL STRIP: ABNORMAL
MUCOUS THREADS #/AREA URNS LPF: PRESENT /LPF
N GONORRHOEA DNA SPEC QL NAA+PROBE: NEGATIVE
NITRATE UR QL: NEGATIVE
PH UR STRIP: 7 [PH] (ref 4.7–8)
RBC URINE: 4 /HPF
SP GR UR STRIP: 1.03 (ref 1–1.03)
SQUAMOUS EPITHELIAL: 16 /HPF
TRICHOMONAS, WET PREP: ABNORMAL
UROBILINOGEN UR STRIP-MCNC: 4 MG/DL
WBC URINE: 5 /HPF
WBC'S/HIGH POWER FIELD, WET PREP: ABNORMAL
YEAST, WET PREP: ABNORMAL

## 2022-07-31 PROCEDURE — 87591 N.GONORRHOEAE DNA AMP PROB: CPT | Performed by: PHYSICIAN ASSISTANT

## 2022-07-31 PROCEDURE — 99213 OFFICE O/P EST LOW 20 MIN: CPT | Performed by: PHYSICIAN ASSISTANT

## 2022-07-31 PROCEDURE — G0463 HOSPITAL OUTPT CLINIC VISIT: HCPCS

## 2022-07-31 PROCEDURE — 87210 SMEAR WET MOUNT SALINE/INK: CPT | Performed by: PHYSICIAN ASSISTANT

## 2022-07-31 PROCEDURE — 87491 CHLMYD TRACH DNA AMP PROBE: CPT | Performed by: PHYSICIAN ASSISTANT

## 2022-07-31 PROCEDURE — 81001 URINALYSIS AUTO W/SCOPE: CPT | Performed by: PHYSICIAN ASSISTANT

## 2022-07-31 ASSESSMENT — ENCOUNTER SYMPTOMS
NEUROLOGICAL NEGATIVE: 1
CARDIOVASCULAR NEGATIVE: 1
RESPIRATORY NEGATIVE: 1
CONSTITUTIONAL NEGATIVE: 1

## 2022-07-31 NOTE — ED PROVIDER NOTES
History     Chief Complaint   Patient presents with     Exposure to STD     HPI  Zara Ibarra is a 21 year old female who presents with concern for STI exposure. Denies symptoms at this time, however had unprotected intercourse. She is in for screening today.     Allergies:  Allergies   Allergen Reactions     No Clinical Screening - See Comments      Red 40 & Cherry dye. Hives and difficulty breathing.      Food Hives     cherries     Red Dye      Red 40       Problem List:    Patient Active Problem List    Diagnosis Date Noted     Anemia due to blood loss, acute--not complicated,  due to  Section---3/2/2019 2019     Priority: Medium     Endometritis following delivery, Leukocytosis--3/2/2019 2019     Priority: Medium      delivery delivered--3/2/2019,  PP Endometritis 2019     Priority: Medium     Normal labor 2019     Priority: Medium     Vitamin D deficiency 2015     Priority: Medium     Sleep disorder breathing 2015     Priority: Medium     Dysmenorrhea 2014     Priority: Medium     Suicidal ideation 2014     Priority: Medium     Environmental allergies      Priority: Medium        Past Medical History:    Past Medical History:   Diagnosis Date     Allergic rhinitis      Asthma      Environmental allergies      Reflux      Suicide attempt (H) 3/15     Tonsillar and adenoid hypertrophy        Past Surgical History:    Past Surgical History:   Procedure Laterality Date      SECTION N/A 3/2/2019    Procedure:  SECTION WITH VIABLE BABY BOY BORN @ 0129.;  Surgeon: Frow, David Franke, MD;  Location: [facility]     TONSILLECTOMY, ADENOIDECTOMY, COMBINED Bilateral 2015    Procedure: COMBINED TONSILLECTOMY, ADENOIDECTOMY;  Surgeon: Pinky Anglin MD;  Location: [facility]       Family History:    Family History   Problem Relation Age of Onset     Allergies Mother      Depression Mother        Social History:  Marital Status:  Single  [1]  Social History     Tobacco Use     Smoking status: Current Every Day Smoker     Packs/day: 0.50     Years: 2.00     Pack years: 1.00     Smokeless tobacco: Never Used     Tobacco comment: Called pt per provider and pt stated not using.   Substance Use Topics     Alcohol use: No     Alcohol/week: 0.0 standard drinks     Drug use: No     Comment: Called pt and pt stated she is not currently using.         Medications:    levonorgestrel-ethinyl estradiol (NORDETTE) 0.15-30 MG-MCG tablet  ondansetron (ZOFRAN ODT) 4 MG ODT tab          Review of Systems   Constitutional: Negative.    Respiratory: Negative.    Cardiovascular: Negative.    Genitourinary: Negative.    Skin: Negative for rash.   Neurological: Negative.        Physical Exam   BP: 104/63  Pulse: 75  Temp: 97.7  F (36.5  C)  Resp: 16  SpO2: 96 %      Physical Exam  Vitals and nursing note reviewed.   Constitutional:       General: She is not in acute distress.     Appearance: She is not toxic-appearing.   Cardiovascular:      Rate and Rhythm: Normal rate.   Pulmonary:      Effort: Pulmonary effort is normal.   Genitourinary:     Comments: Self swab- declined exam.   Skin:     General: Skin is warm and dry.   Neurological:      Mental Status: She is alert and oriented to person, place, and time.         ED Course       Results for orders placed or performed during the hospital encounter of 07/31/22 (from the past 24 hour(s))   UA with Microscopic reflex to Culture    Specimen: Urine, NOS   Result Value Ref Range    Color Urine Yellow Colorless, Straw, Light Yellow, Yellow    Appearance Urine Slightly Cloudy (A) Clear    Glucose Urine Negative Negative mg/dL    Bilirubin Urine Negative Negative    Ketones Urine Negative Negative mg/dL    Specific Gravity Urine 1.030 1.003 - 1.035    Blood Urine Negative Negative    pH Urine 7.0 4.7 - 8.0    Protein Albumin Urine 20  (A) Negative mg/dL    Urobilinogen Urine 4.0 (A) Normal, 2.0 mg/dL    Nitrite Urine Negative  Negative    Leukocyte Esterase Urine Moderate (A) Negative    Mucus Urine Present (A) None Seen /LPF    RBC Urine 4 (H) <=2 /HPF    WBC Urine 5 <=5 /HPF    Squamous Epithelials Urine 16 (H) <=1 /HPF   Wet prep    Specimen: Vagina; Swab   Result Value Ref Range    Trichomonas Absent Absent    Yeast Absent Absent    Clue Cells Present (A) Absent    WBCs/high power field 2+ (A) None   Chlamydia trachomatis/Neisseria gonorrhoeae by PCR    Specimen: Vagina; Swab   Result Value Ref Range    Chlamydia Trachomatis Negative Negative    Neisseria gonorrhoeae Negative Negative    Narrative    Assay performed using [employer] real-time, reverse-transcriptase PCR.       Medications - No data to display    Assessments & Plan (with Medical Decision Making)     I have reviewed the nursing notes.  I have reviewed the findings, diagnosis, plan and need for follow up with the patient.    New Prescriptions    No medications on file       Final diagnoses:   Screen for STD (sexually transmitted disease)   Called pt with results as they were still pending upon request for discharge. Pos clue cells, neg G/C. Asymptomatic at this time and declines Tx. F/U with any onset symptoms, concerns.     7/31/2022   [facility]     Amrit Rivers PA  07/31/22 0457

## 2022-09-04 ENCOUNTER — HEALTH MAINTENANCE LETTER (OUTPATIENT)
Age: 22
End: 2022-09-04

## 2023-01-28 NOTE — PLAN OF CARE
Assessments as charted. B/P: 124/63, T: 99.3, P: 101, R: 18. Rates pain: 3-6/10 but reports adequate pain control with prn pain meds. Incision: Staple intact, healing well, well approximated, without signs of infection and no drainage. Voiding without difficulty. Fundus firm at U-2. Lochia: Light. Activity: pt calling for assistance getting out of bed at the start of this shift, pt is greater than 48 hrs post-op.  Standby assistance provided and pt was steady on her feet and tolerated activity well.  Pt encouraged to be up and ambulate in the halls at least 4x's/day, she verbalized understanding.   Infant feeding: Breast feeding going well, with minimal assistance from staff.     LATCH Score:   Latch: 2 - Good Latch  Audible Swallowin - Spontaneous & frequent  Type of Nipple: (Breast/Nipple) 2 - Everted  Comfort: 1 - Filling, small blisters, mild/mod pain  Hold: 1 - Min. Assist   Total LATCH Score: 8    Postpartum breastfeeding assessment completed and education provided, see Patient Education Activity.  Items included in the education are:   proper positioning and latch  effectiveness of feeding  manual expression  handling and storing breastmilk  maintenance of breastfeeding for the first 6 months  sign/symptoms of infant feeding issues requiring referral to qualified health care provider  Postpartum care education provided, see Patient Education activity. Patient denies needs. Will monitor.  Elvia Castellanos      Yes

## 2023-04-29 ENCOUNTER — HEALTH MAINTENANCE LETTER (OUTPATIENT)
Age: 23
End: 2023-04-29

## 2023-06-22 NOTE — ED PROVIDER NOTES
"  History     Chief Complaint   Patient presents with     Fever     \"fever 100.4 an hour ago and ibuprofen and nyquil was given\"      Pharyngitis     \"4-5 days\"      The history is provided by the patient and a caregiver. No  was used.     Zara Aguilera is a 17 year old female who presents with a cough and fever for 5 days, sore throat for 2 days. Taking ibuprofen and nyquil.   Chest hurts with coughing. Eating and drinking some. Fatigued and miserable.     Problem List:    Patient Active Problem List    Diagnosis Date Noted     Pain of left sacroiliac joint 08/06/2015     Priority: Medium     Vitamin D deficiency 08/03/2015     Priority: Medium     Laceration of left wrist, subsequent encounter 08/03/2015     Priority: Medium     Suicidal ideations 08/02/2015     Priority: Medium     Sleep disorder breathing 03/09/2015     Priority: Medium     Dysmenorrhea 07/01/2014     Priority: Medium     Suicidal ideation 05/08/2014     Priority: Medium     Environmental allergies      Priority: Medium     Suicide attempt      Priority: Medium        Past Medical History:    Past Medical History:   Diagnosis Date     Allergic rhinitis      Asthma      Environmental allergies      Reflux      Suicide attempt 3/15     Tonsillar and adenoid hypertrophy        Past Surgical History:    Past Surgical History:   Procedure Laterality Date     TONSILLECTOMY, ADENOIDECTOMY, COMBINED Bilateral 4/28/2015    Procedure: COMBINED TONSILLECTOMY, ADENOIDECTOMY;  Surgeon: Pinky Anglin MD;  Location: [facility]       Family History:    Family History   Problem Relation Age of Onset     Allergies Mother      Depression Mother        Social History:  Marital Status:  Single [1]  Social History   Substance Use Topics     Smoking status: Current Every Day Smoker     Packs/day: 0.50     Years: 2.00     Smokeless tobacco: Never Used     Alcohol use No        Medications:      azithromycin (ZITHROMAX Z-MUSA) 250 MG tablet "   IBUPROFEN PO     Review of Systems   Constitutional: Positive for chills, fatigue and fever.   HENT: Positive for sore throat.    Respiratory: Positive for cough. Negative for shortness of breath.    Gastrointestinal: Negative for abdominal pain.     Physical Exam   BP: 137/81  Pulse: 92  Temp: 100  F (37.8  C)  Resp: 18  Weight: 99.8 kg (220 lb)  SpO2: 98 %      Physical Exam   Constitutional: She is oriented to person, place, and time. Vital signs are normal. She appears well-developed and well-nourished. She is cooperative.  Non-toxic appearance. She appears ill. No distress.   HENT:   Head: Normocephalic and atraumatic.   Right Ear: Tympanic membrane and external ear normal.   Left Ear: Tympanic membrane and external ear normal.   Nose: Nose normal.   Mouth/Throat: Uvula is midline, oropharynx is clear and moist and mucous membranes are normal. No oropharyngeal exudate.   Eyes: Conjunctivae and lids are normal. No scleral icterus.   Neck: Normal range of motion. Neck supple.   Cardiovascular: Normal rate, regular rhythm and normal heart sounds.    Pulmonary/Chest: Effort normal and breath sounds normal.   Congestion evident   Lymphadenopathy:     She has no cervical adenopathy.   Neurological: She is alert and oriented to person, place, and time.   Skin: Skin is warm and dry. She is not diaphoretic.   Psychiatric: She has a normal mood and affect. Her behavior is normal.   Nursing note and vitals reviewed.      ED Course     ED Course     Procedures      Results for orders placed or performed during the hospital encounter of 05/24/18 (from the past 24 hour(s))   Rapid strep screen   Result Value Ref Range    Specimen Description Throat     Rapid Strep A Screen       NEGATIVE: No Group A streptococcal antigen detected by immunoassay, await culture report.   CBC with platelets differential   Result Value Ref Range    WBC 10.8 4.0 - 11.0 10e9/L    RBC Count 4.87 3.8 - 5.2 10e12/L    Hemoglobin 13.2 11.7 - 15.7  g/dL    Hematocrit 39.4 35.0 - 47.0 %    MCV 81 78 - 100 fl    MCH 27.1 26.5 - 33.0 pg    MCHC 33.5 31.5 - 36.5 g/dL    RDW 14.3 10.0 - 15.0 %    Platelet Count 284 150 - 450 10e9/L    Diff Method Automated Method     % Neutrophils 71.5 %    % Lymphocytes 17.8 %    % Monocytes 9.6 %    % Eosinophils 0.6 %    % Basophils 0.4 %    % Immature Granulocytes 0.1 %    Nucleated RBCs 0 0 /100    Absolute Neutrophil 7.7 1.6 - 8.3 10e9/L    Absolute Lymphocytes 1.9 0.8 - 5.3 10e9/L    Absolute Monocytes 1.0 0.0 - 1.3 10e9/L    Absolute Eosinophils 0.1 0.0 - 0.7 10e9/L    Absolute Basophils 0.0 0.0 - 0.2 10e9/L    Abs Immature Granulocytes 0.0 0 - 0.4 10e9/L    Absolute Nucleated RBC 0.0    Mononucleosis screen   Result Value Ref Range    Mononucleosis Screen Negative NEG^Negative     Medications   azithromycin (ZITHROMAX) tablet 500 mg (500 mg Oral Given 5/24/18 0644)       Assessments & Plan (with Medical Decision Making)     I have reviewed the nursing notes.  I have reviewed the findings, diagnosis, plan and need for follow up with the patient.  Negative rapid strep, CBC 10.8, mono neg, CXR neg.   Fever for 5 days, will treat as bronchitis with azithromycin.   Given Epic educational materials.       New Prescriptions    AZITHROMYCIN (ZITHROMAX Z-MUSA) 250 MG TABLET    Two tablets on the first day, then one tablet daily for the next 4 days     Final diagnoses:   Bronchitis     Advised:   Patient verbally educated and given appropriate education sheets for each of their diagnoses and has no questions.  Take ibuprofen or Tylenol as directed on the bottle as needed.  Take OTC cold medicine as directed on bottle  Take prescription medications as directed.  Increase fluids and rest.  Return to ED/UC if symptoms worsen or concerns develop  Follow up with PCP for re-evaluation in 1 week or sooner as needed.      5/24/2018   [facility]     Nanci Ghosh NP  05/24/18 2756     Plan: Wash hands less often and moisturize with creams/ointments when exposed to water/washing. Detail Level: Detailed

## 2024-07-06 ENCOUNTER — HEALTH MAINTENANCE LETTER (OUTPATIENT)
Age: 24
End: 2024-07-06

## 2024-12-04 ENCOUNTER — HOSPITAL ENCOUNTER (EMERGENCY)
Facility: HOSPITAL | Age: 24
Discharge: LEFT BEFORE DISCHARGE | End: 2024-12-04
Attending: STUDENT IN AN ORGANIZED HEALTH CARE EDUCATION/TRAINING PROGRAM
Payer: COMMERCIAL

## 2024-12-04 VITALS
BODY MASS INDEX: 25.62 KG/M2 | SYSTOLIC BLOOD PRESSURE: 122 MMHG | RESPIRATION RATE: 16 BRPM | HEART RATE: 73 BPM | TEMPERATURE: 97.6 F | OXYGEN SATURATION: 99 % | WEIGHT: 183.7 LBS | DIASTOLIC BLOOD PRESSURE: 73 MMHG

## 2024-12-04 PROCEDURE — 99281 EMR DPT VST MAYX REQ PHY/QHP: CPT | Performed by: STUDENT IN AN ORGANIZED HEALTH CARE EDUCATION/TRAINING PROGRAM

## 2024-12-04 ASSESSMENT — COLUMBIA-SUICIDE SEVERITY RATING SCALE - C-SSRS
2. HAVE YOU ACTUALLY HAD ANY THOUGHTS OF KILLING YOURSELF IN THE PAST MONTH?: NO
1. IN THE PAST MONTH, HAVE YOU WISHED YOU WERE DEAD OR WISHED YOU COULD GO TO SLEEP AND NOT WAKE UP?: NO
6. HAVE YOU EVER DONE ANYTHING, STARTED TO DO ANYTHING, OR PREPARED TO DO ANYTHING TO END YOUR LIFE?: NO

## 2024-12-04 ASSESSMENT — ACTIVITIES OF DAILY LIVING (ADL)
ADLS_ACUITY_SCORE: 42
ADLS_ACUITY_SCORE: 42

## 2024-12-04 NOTE — ED NOTES
Pt walk out of room and left the facility. Pt did not call staff before leaving so no AMA form signed.

## 2024-12-04 NOTE — ED TRIAGE NOTES
Pt presents with c/o abd pain with nausea and vomiting for the past 3 days. Pt states she vomits about 2x a day. Pt also states she has had intermittent chest pain with theses episodes.

## 2025-03-11 ENCOUNTER — HOSPITAL ENCOUNTER (EMERGENCY)
Facility: HOSPITAL | Age: 25
Discharge: HOME | End: 2025-03-11
Attending: EMERGENCY MEDICINE
Payer: COMMERCIAL

## 2025-03-11 VITALS
DIASTOLIC BLOOD PRESSURE: 73 MMHG | OXYGEN SATURATION: 98 % | BODY MASS INDEX: 24.88 KG/M2 | WEIGHT: 178.35 LBS | HEART RATE: 95 BPM | TEMPERATURE: 98.7 F | RESPIRATION RATE: 20 BRPM | SYSTOLIC BLOOD PRESSURE: 123 MMHG

## 2025-03-11 DIAGNOSIS — J21.0 RSV BRONCHIOLITIS: ICD-10-CM

## 2025-03-11 DIAGNOSIS — R06.2 WHEEZING: ICD-10-CM

## 2025-03-11 DIAGNOSIS — F17.200 SMOKER: ICD-10-CM

## 2025-03-11 PROCEDURE — 99284 EMERGENCY DEPT VISIT MOD MDM: CPT | Mod: 25

## 2025-03-11 PROCEDURE — 250N000009 HC RX 250: Performed by: EMERGENCY MEDICINE

## 2025-03-11 PROCEDURE — 999N000157 HC STATISTIC RCP TIME EA 10 MIN

## 2025-03-11 PROCEDURE — 94640 AIRWAY INHALATION TREATMENT: CPT

## 2025-03-11 PROCEDURE — 250N000012 HC RX MED GY IP 250 OP 636 PS 637: Performed by: EMERGENCY MEDICINE

## 2025-03-11 PROCEDURE — 99283 EMERGENCY DEPT VISIT LOW MDM: CPT | Performed by: EMERGENCY MEDICINE

## 2025-03-11 RX ORDER — PREDNISONE 20 MG/1
TABLET ORAL
Qty: 8 TABLET | Refills: 0 | Status: SHIPPED | OUTPATIENT
Start: 2025-03-12 | End: 2025-03-15

## 2025-03-11 RX ORDER — ALBUTEROL SULFATE 90 UG/1
2 INHALANT RESPIRATORY (INHALATION) EVERY 6 HOURS PRN
Qty: 18 G | Refills: 0 | Status: SHIPPED | OUTPATIENT
Start: 2025-03-11

## 2025-03-11 RX ORDER — ONDANSETRON 4 MG/1
4 TABLET, ORALLY DISINTEGRATING ORAL
COMMUNITY
Start: 2025-03-10

## 2025-03-11 RX ORDER — PREDNISONE 20 MG/1
40 TABLET ORAL ONCE
Status: COMPLETED | OUTPATIENT
Start: 2025-03-11 | End: 2025-03-11

## 2025-03-11 RX ORDER — IPRATROPIUM BROMIDE AND ALBUTEROL SULFATE 2.5; .5 MG/3ML; MG/3ML
3 SOLUTION RESPIRATORY (INHALATION) ONCE
Status: COMPLETED | OUTPATIENT
Start: 2025-03-11 | End: 2025-03-11

## 2025-03-11 RX ADMIN — IPRATROPIUM BROMIDE AND ALBUTEROL SULFATE 3 ML: .5; 3 SOLUTION RESPIRATORY (INHALATION) at 21:30

## 2025-03-11 RX ADMIN — PREDNISONE 40 MG: 20 TABLET ORAL at 21:37

## 2025-03-11 ASSESSMENT — COLUMBIA-SUICIDE SEVERITY RATING SCALE - C-SSRS
1. IN THE PAST MONTH, HAVE YOU WISHED YOU WERE DEAD OR WISHED YOU COULD GO TO SLEEP AND NOT WAKE UP?: NO
6. HAVE YOU EVER DONE ANYTHING, STARTED TO DO ANYTHING, OR PREPARED TO DO ANYTHING TO END YOUR LIFE?: NO
2. HAVE YOU ACTUALLY HAD ANY THOUGHTS OF KILLING YOURSELF IN THE PAST MONTH?: NO

## 2025-03-12 NOTE — ED TRIAGE NOTES
Patient presents with complaints of a cough and chest pain. States she was seen in [place] yesterday for nausea and vomiting and found out she has RSV. She states she feel SOB.     Triage Assessment (Adult)       Row Name 03/11/25 4648          Respiratory WDL    Respiratory WDL X;cough     Cough Frequency infrequent        Cognitive/Neuro/Behavioral WDL    Cognitive/Neuro/Behavioral WDL WDL

## 2025-03-12 NOTE — DISCHARGE INSTRUCTIONS
Initiate albuterol and prednisone for wheezing  Follow-up with your doctor or return if any new or concerning symptoms.

## 2025-03-12 NOTE — ED PROVIDER NOTES
History     Chief Complaint   Patient presents with    Cough    Chest Pain     HPI  Zara Ibarra is a 24 year old female who was seen and evaluated yesterday with shortness of breath found to have RSV, representing now with wheezing, plan for neb and prednisone    Allergies:  Allergies   Allergen Reactions    No Clinical Screening - See Comments      Red 40 & Cherry dye. Hives and difficulty breathing.     Food Hives     cherries    Red Dye #40 (Allura Red)      Red 40       Problem List:    Patient Active Problem List    Diagnosis Date Noted    Anemia due to blood loss, acute--not complicated,  due to  Section---3/2/2019 2019     Priority: Medium    Endometritis following delivery, Leukocytosis--3/2/2019 2019     Priority: Medium     delivery delivered--3/2/2019,  PP Endometritis 2019     Priority: Medium    Normal labor 2019     Priority: Medium    Vitamin D deficiency 2015     Priority: Medium    Sleep disorder breathing 2015     Priority: Medium    Dysmenorrhea 2014     Priority: Medium    Suicidal ideation 2014     Priority: Medium    Environmental allergies      Priority: Medium        Past Medical History:    Past Medical History:   Diagnosis Date    Allergic rhinitis     Asthma     Environmental allergies     Reflux     Suicide attempt ([facility]) 3/15    Tonsillar and adenoid hypertrophy        Past Surgical History:    Past Surgical History:   Procedure Laterality Date     SECTION N/A 3/2/2019    Procedure:  SECTION WITH VIABLE BABY BOY BORN @ 0129.;  Surgeon: Frow, David Franke, MD;  Location: [facility]    TONSILLECTOMY, ADENOIDECTOMY, COMBINED Bilateral 2015    Procedure: COMBINED TONSILLECTOMY, ADENOIDECTOMY;  Surgeon: Pinky Anglin MD;  Location: [facility]       Family History:    Family History   Problem Relation Age of Onset    Allergies Mother     Depression Mother        Social History:  Marital Status:  Single  [1]  Social History     Tobacco Use    Smoking status: Every Day     Current packs/day: 0.50     Average packs/day: 0.5 packs/day for 2.0 years (1.0 ttl pk-yrs)     Types: Cigarettes    Smokeless tobacco: Never    Tobacco comments:     Called pt per provider and pt stated not using.   Substance Use Topics    Alcohol use: Yes     Comment: monthly    Drug use: Yes     Types: Marijuana     Comment: daily        Medications:    ondansetron (ZOFRAN ODT) 4 MG ODT tab  albuterol (PROAIR HFA/PROVENTIL HFA/VENTOLIN HFA) 108 (90 Base) MCG/ACT inhaler  levonorgestrel-ethinyl estradiol (NORDETTE) 0.15-30 MG-MCG tablet  [START ON 3/12/2025] predniSONE (DELTASONE) 20 MG tablet          Review of Systems  Resp per hpi   Const no fever      Physical Exam   BP: 123/73  Pulse: 95  Temp: 98.7  F (37.1  C)  Resp: 20  Weight: 80.9 kg (178 lb 5.6 oz)  SpO2: 99 %      Physical Exam  HENT:      Head: Normocephalic and atraumatic.   Eyes:      Extraocular Movements: Extraocular movements intact.   Cardiovascular:      Rate and Rhythm: Normal rate.   Pulmonary:      Effort: Pulmonary effort is normal. No tachypnea.      Comments: wheezing  Abdominal:      Palpations: Abdomen is soft.   Musculoskeletal:         General: Normal range of motion.      Cervical back: Normal range of motion.   Skin:     General: Skin is warm and dry.      Capillary Refill: Capillary refill takes less than 2 seconds.   Neurological:      General: No focal deficit present.      Mental Status: She is alert.   Psychiatric:         Mood and Affect: Mood normal.              No results found for this or any previous visit (from the past 24 hours).    Medications   ipratropium - albuterol 0.5 mg/2.5 mg/3 mL (DUONEB) neb solution 3 mL (3 mLs Nebulization $Given 3/11/25 2130)   predniSONE (DELTASONE) tablet 40 mg (40 mg Oral $Given 3/11/25 2137)       Assessments & Plan (with Medical Decision Making)   Patient seen and evaluated yesterday with extensive evaluation,  wheezing now, nebs given with improvement.  She does have a diagnosis of RSV.  Vital signs reassuring.  Plan for outpatient albuterol inhaler and prednisone burst.  Patient agrees with plan.  She agrees to return if any new or concerning symptoms.  Otherwise to follow-up with primary care.    New Prescriptions    ALBUTEROL (PROAIR HFA/PROVENTIL HFA/VENTOLIN HFA) 108 (90 BASE) MCG/ACT INHALER    Inhale 2 puffs into the lungs every 6 hours as needed for shortness of breath, wheezing or cough.    PREDNISONE (DELTASONE) 20 MG TABLET    Take two tablets (= 40mg) each day for 4 (four) days       Final diagnoses:   Wheezing   Smoker   RSV bronchiolitis       3/11/2025   [facility]       Sabas Louise MD  03/11/25 2133       Sabas Louise MD  03/11/25 2138

## 2025-07-13 ENCOUNTER — HEALTH MAINTENANCE LETTER (OUTPATIENT)
Age: 25
End: 2025-07-13

## (undated) DEVICE — CATH TRAY-16FR METER W/STATLOCK LATEX]

## (undated) DEVICE — SCD SLEEVE-KNEE REG.

## (undated) DEVICE — SUTURE-PDS II 0 CTX Z370T

## (undated) DEVICE — GLV-8.5 BIOGEL LATEX

## (undated) DEVICE — LIGHT HANDLE COVER

## (undated) DEVICE — CANISTER-SUCTION 2000CC

## (undated) DEVICE — IRRIGATION-H2O 1000ML

## (undated) DEVICE — TAPE-MEDIPORE 4" X 2YD

## (undated) DEVICE — LABEL-STERILE PREPRINTED FOR OR

## (undated) DEVICE — APPLICATOR-CHLORAPREP 26ML TINTED CHG 2%+ 70% IPA-SURGICAL

## (undated) DEVICE — SUTURE-CHROMIC GUT 1 CTX 905H

## (undated) DEVICE — SENSOR-OXISENSOR II ADULT

## (undated) DEVICE — SUTURE-VICRYL 0 CT J358H

## (undated) DEVICE — GOWN-SURG XL LVL 3 REINFORCED

## (undated) DEVICE — PACK-C-SECTION-CUSTOM

## (undated) DEVICE — IRRIGATION-NACL 1000ML

## (undated) DEVICE — CAUTERY PAD-POLYHESIVE II ADULT

## (undated) DEVICE — FLUID TRAP FOR VIROSAFE FILTERS

## (undated) DEVICE — DRSG-SPONGE STERILE 8 X 4

## (undated) RX ORDER — OXYTOCIN/0.9 % SODIUM CHLORIDE 30/500 ML
PLASTIC BAG, INJECTION (ML) INTRAVENOUS
Status: DISPENSED
Start: 2019-03-02

## (undated) RX ORDER — DEXAMETHASONE SODIUM PHOSPHATE 10 MG/ML
INJECTION, SOLUTION INTRAMUSCULAR; INTRAVENOUS
Status: DISPENSED
Start: 2019-03-02

## (undated) RX ORDER — ONDANSETRON 2 MG/ML
INJECTION INTRAMUSCULAR; INTRAVENOUS
Status: DISPENSED
Start: 2019-03-02

## (undated) RX ORDER — PROPOFOL 10 MG/ML
INJECTION, EMULSION INTRAVENOUS
Status: DISPENSED
Start: 2019-03-02